# Patient Record
Sex: FEMALE | Race: WHITE | Employment: OTHER | URBAN - METROPOLITAN AREA
[De-identification: names, ages, dates, MRNs, and addresses within clinical notes are randomized per-mention and may not be internally consistent; named-entity substitution may affect disease eponyms.]

---

## 2020-01-16 ENCOUNTER — HOSPITAL ENCOUNTER (OUTPATIENT)
Dept: GENERAL RADIOLOGY | Age: 75
Discharge: HOME | End: 2020-01-16
Payer: MEDICARE

## 2020-01-16 DIAGNOSIS — M79.662 PAIN OF LEFT LOWER LEG: ICD-10-CM

## 2020-01-16 PROCEDURE — 73590 X-RAY EXAM OF LOWER LEG: CPT

## 2024-01-29 ENCOUNTER — HOSPITAL ENCOUNTER (OUTPATIENT)
Facility: HOSPITAL | Age: 79
Discharge: HOME | End: 2024-02-01
Payer: MEDICARE

## 2024-01-29 DIAGNOSIS — Z12.31 BREAST CANCER SCREENING BY MAMMOGRAM: ICD-10-CM

## 2024-01-29 DIAGNOSIS — Z78.0 ASYMPTOMATIC MENOPAUSAL STATE: ICD-10-CM

## 2024-01-29 PROCEDURE — 77063 BREAST TOMOSYNTHESIS BI: CPT

## 2024-01-29 PROCEDURE — 77080 DXA BONE DENSITY AXIAL: CPT

## 2024-10-05 ENCOUNTER — APPOINTMENT (OUTPATIENT)
Facility: HOSPITAL | Age: 79
End: 2024-10-05
Payer: MEDICARE

## 2024-10-05 ENCOUNTER — HOSPITAL ENCOUNTER (INPATIENT)
Facility: HOSPITAL | Age: 79
LOS: 6 days | Discharge: NURSING, REHAB OR LONG-TERM CARE | End: 2024-10-11
Attending: STUDENT IN AN ORGANIZED HEALTH CARE EDUCATION/TRAINING PROGRAM | Admitting: FAMILY MEDICINE
Payer: MEDICARE

## 2024-10-05 DIAGNOSIS — E87.1 HYPONATREMIA: ICD-10-CM

## 2024-10-05 DIAGNOSIS — R41.82 ALTERED MENTAL STATUS, UNSPECIFIED ALTERED MENTAL STATUS TYPE: ICD-10-CM

## 2024-10-05 DIAGNOSIS — J81.0 ACUTE PULMONARY EDEMA: Primary | ICD-10-CM

## 2024-10-05 LAB
ALBUMIN SERPL-MCNC: 3 G/DL (ref 3.5–5)
ALBUMIN SERPL-MCNC: 3.2 G/DL (ref 3.5–5)
ALBUMIN/GLOB SERPL: 0.9 (ref 1.1–2.2)
ALBUMIN/GLOB SERPL: 0.9 (ref 1.1–2.2)
ALP SERPL-CCNC: 68 U/L (ref 45–117)
ALP SERPL-CCNC: 72 U/L (ref 45–117)
ALT SERPL-CCNC: 114 U/L (ref 12–78)
ALT SERPL-CCNC: 126 U/L (ref 12–78)
ANION GAP SERPL CALC-SCNC: 7 MMOL/L (ref 2–12)
ANION GAP SERPL CALC-SCNC: 8 MMOL/L (ref 2–12)
AST SERPL-CCNC: 69 U/L (ref 15–37)
AST SERPL-CCNC: 81 U/L (ref 15–37)
BASOPHILS # BLD: 0 K/UL (ref 0–0.1)
BASOPHILS NFR BLD: 0 % (ref 0–1)
BILIRUB SERPL-MCNC: 1.1 MG/DL (ref 0.2–1)
BILIRUB SERPL-MCNC: 1.2 MG/DL (ref 0.2–1)
BUN SERPL-MCNC: 5 MG/DL (ref 6–20)
BUN SERPL-MCNC: 5 MG/DL (ref 6–20)
BUN/CREAT SERPL: 8 (ref 12–20)
BUN/CREAT SERPL: 9 (ref 12–20)
CALCIUM SERPL-MCNC: 8.4 MG/DL (ref 8.5–10.1)
CALCIUM SERPL-MCNC: 8.6 MG/DL (ref 8.5–10.1)
CHLORIDE SERPL-SCNC: 79 MMOL/L (ref 97–108)
CHLORIDE SERPL-SCNC: 81 MMOL/L (ref 97–108)
CO2 SERPL-SCNC: 26 MMOL/L (ref 21–32)
CO2 SERPL-SCNC: 28 MMOL/L (ref 21–32)
CREAT SERPL-MCNC: 0.55 MG/DL (ref 0.55–1.02)
CREAT SERPL-MCNC: 0.6 MG/DL (ref 0.55–1.02)
DIFFERENTIAL METHOD BLD: ABNORMAL
EOSINOPHIL # BLD: 0 K/UL (ref 0–0.4)
EOSINOPHIL NFR BLD: 0 % (ref 0–7)
ERYTHROCYTE [DISTWIDTH] IN BLOOD BY AUTOMATED COUNT: 11.2 % (ref 11.5–14.5)
FLUAV RNA SPEC QL NAA+PROBE: NOT DETECTED
FLUBV RNA SPEC QL NAA+PROBE: NOT DETECTED
GLOBULIN SER CALC-MCNC: 3.5 G/DL (ref 2–4)
GLOBULIN SER CALC-MCNC: 3.7 G/DL (ref 2–4)
GLUCOSE SERPL-MCNC: 137 MG/DL (ref 65–100)
GLUCOSE SERPL-MCNC: 167 MG/DL (ref 65–100)
HCT VFR BLD AUTO: 33 % (ref 35–47)
HGB BLD-MCNC: 11.8 G/DL (ref 11.5–16)
IMM GRANULOCYTES # BLD AUTO: 0.1 K/UL (ref 0–0.04)
IMM GRANULOCYTES NFR BLD AUTO: 1 % (ref 0–0.5)
LYMPHOCYTES # BLD: 1.4 K/UL (ref 0.8–3.5)
LYMPHOCYTES NFR BLD: 10 % (ref 12–49)
MCH RBC QN AUTO: 29.9 PG (ref 26–34)
MCHC RBC AUTO-ENTMCNC: 35.8 G/DL (ref 30–36.5)
MCV RBC AUTO: 83.5 FL (ref 80–99)
MONOCYTES # BLD: 1.8 K/UL (ref 0–1)
MONOCYTES NFR BLD: 13 % (ref 5–13)
NEUTS SEG # BLD: 10.3 K/UL (ref 1.8–8)
NEUTS SEG NFR BLD: 76 % (ref 32–75)
NRBC # BLD: 0 K/UL (ref 0–0.01)
NRBC BLD-RTO: 0 PER 100 WBC
PLATELET # BLD AUTO: 219 K/UL (ref 150–400)
PMV BLD AUTO: 10.1 FL (ref 8.9–12.9)
POTASSIUM SERPL-SCNC: 3.1 MMOL/L (ref 3.5–5.1)
POTASSIUM SERPL-SCNC: 3.4 MMOL/L (ref 3.5–5.1)
PROT SERPL-MCNC: 6.5 G/DL (ref 6.4–8.2)
PROT SERPL-MCNC: 6.9 G/DL (ref 6.4–8.2)
RBC # BLD AUTO: 3.95 M/UL (ref 3.8–5.2)
SARS-COV-2 RNA RESP QL NAA+PROBE: NOT DETECTED
SODIUM SERPL-SCNC: 114 MMOL/L (ref 136–145)
SODIUM SERPL-SCNC: 115 MMOL/L (ref 136–145)
SOURCE: NORMAL
T4 FREE SERPL-MCNC: 1.6 NG/DL (ref 0.8–1.5)
TROPONIN I SERPL HS-MCNC: 14 NG/L (ref 0–51)
TROPONIN I SERPL HS-MCNC: 18 NG/L (ref 0–51)
TSH SERPL DL<=0.05 MIU/L-ACNC: 2.33 UIU/ML (ref 0.36–3.74)
WBC # BLD AUTO: 13.6 K/UL (ref 3.6–11)

## 2024-10-05 PROCEDURE — 2060000000 HC ICU INTERMEDIATE R&B

## 2024-10-05 PROCEDURE — 70450 CT HEAD/BRAIN W/O DYE: CPT

## 2024-10-05 PROCEDURE — 93005 ELECTROCARDIOGRAM TRACING: CPT | Performed by: PHYSICIAN ASSISTANT

## 2024-10-05 PROCEDURE — 80053 COMPREHEN METABOLIC PANEL: CPT

## 2024-10-05 PROCEDURE — 84443 ASSAY THYROID STIM HORMONE: CPT

## 2024-10-05 PROCEDURE — 71046 X-RAY EXAM CHEST 2 VIEWS: CPT

## 2024-10-05 PROCEDURE — 99285 EMERGENCY DEPT VISIT HI MDM: CPT

## 2024-10-05 PROCEDURE — 87636 SARSCOV2 & INF A&B AMP PRB: CPT

## 2024-10-05 PROCEDURE — 84484 ASSAY OF TROPONIN QUANT: CPT

## 2024-10-05 PROCEDURE — 36415 COLL VENOUS BLD VENIPUNCTURE: CPT

## 2024-10-05 PROCEDURE — 2580000003 HC RX 258: Performed by: PHYSICIAN ASSISTANT

## 2024-10-05 PROCEDURE — 85025 COMPLETE CBC W/AUTO DIFF WBC: CPT

## 2024-10-05 PROCEDURE — 84439 ASSAY OF FREE THYROXINE: CPT

## 2024-10-05 PROCEDURE — 93005 ELECTROCARDIOGRAM TRACING: CPT | Performed by: STUDENT IN AN ORGANIZED HEALTH CARE EDUCATION/TRAINING PROGRAM

## 2024-10-05 RX ORDER — SODIUM CHLORIDE 9 MG/ML
INJECTION, SOLUTION INTRAVENOUS CONTINUOUS
Status: DISCONTINUED | OUTPATIENT
Start: 2024-10-05 | End: 2024-10-06

## 2024-10-05 RX ADMIN — SODIUM CHLORIDE: 9 INJECTION, SOLUTION INTRAVENOUS at 22:25

## 2024-10-05 ASSESSMENT — PAIN - FUNCTIONAL ASSESSMENT: PAIN_FUNCTIONAL_ASSESSMENT: NONE - DENIES PAIN

## 2024-10-05 NOTE — ED TRIAGE NOTES
Pt arrives with reports of non productive cough and sore throat x5 days. Denies chest pain or shortness of breath. Pt reports known sick contact with RSV recently.

## 2024-10-05 NOTE — ED PROVIDER NOTES
who either signs or Co-signs this chart in the absence of a cardiologist.        RADIOLOGY:   Non-plain film images such as CT, Ultrasound and MRI are read by the radiologist. Plain radiographic images are visualized and preliminarily interpreted by the emergency physician with the below findings:        Interpretation per the Radiologist below, if available at the time of this note:    CT HEAD WO CONTRAST   Final Result   No acute intracranial abnormality.         Electronically signed by SAMMY LIMA      XR CHEST (2 VW)   Final Result   No acute finding.         Electronically signed by SAMMY LIMA           LABS:  Labs Reviewed   CBC WITH AUTO DIFFERENTIAL - Abnormal; Notable for the following components:       Result Value    WBC 13.6 (*)     Hematocrit 33.0 (*)     RDW 11.2 (*)     Neutrophils % 76 (*)     Lymphocytes % 10 (*)     Immature Granulocytes % 1 (*)     Neutrophils Absolute 10.3 (*)     Monocytes Absolute 1.8 (*)     Immature Granulocytes Absolute 0.1 (*)     All other components within normal limits   COMPREHENSIVE METABOLIC PANEL - Abnormal; Notable for the following components:    Sodium 114 (*)     Potassium 3.4 (*)     Chloride 79 (*)     Glucose 167 (*)     BUN 5 (*)     BUN/Creatinine Ratio 8 (*)     Total Bilirubin 1.2 (*)      (*)     AST 81 (*)     Albumin 3.2 (*)     Albumin/Globulin Ratio 0.9 (*)     All other components within normal limits   TSH + FREE T4 PANEL - Abnormal; Notable for the following components:    T4 Free 1.6 (*)     All other components within normal limits   COVID-19 & INFLUENZA COMBO   URINE CULTURE HOLD SAMPLE   TROPONIN   URINALYSIS WITH MICROSCOPIC   TROPONIN   COMPREHENSIVE METABOLIC PANEL       All other labs were within normal range or not returned as of this dictation.    EMERGENCY DEPARTMENT COURSE and DIFFERENTIAL DIAGNOSIS/MDM:   Vitals:    Vitals:    10/05/24 1751 10/05/24 2015   BP: (!) 158/72    Pulse: 80    Resp: 20    Temp: 98.3 °F (36.8 °C)   Unknown  Readmission: No  Isolation Requirements: no  Recommended Level of Care: telemetry  Department: [facility] - [phone]  Consulting Provider: Dr. Ian Ellsworth    Other: 78-year-old F brought into ED today due to neighbor concern of altered mental status.  Patient reports cough, sore throat, nausea x 5 days.  Emesis x 1 today. Workup significant hyponatremia 114.  Started IV NS infusion 200 ml/ hr x 5 hours.  Requesting admission of patient.        PROCEDURES:  Unless otherwise noted below, none     Procedures      FINAL IMPRESSION      1. Hyponatremia    2. Altered mental status, unspecified altered mental status type          DISPOSITION/PLAN   DISPOSITION Admitted 10/05/2024 10:17:39 PM      PATIENT REFERRED TO:  No follow-up provider specified.    DISCHARGE MEDICATIONS:  New Prescriptions    No medications on file         Presentation, management, and disposition were discussed with the attending physician, Dr. Landon who is in agreement with plan of care.    (Please note that portions of this note were completed with a voice recognition program.  Efforts were made to edit the dictations but occasionally words are mis-transcribed.)    Renaldo Saldana PA-C (electronically signed)  Emergency Attending Physician / Physician Assistant / Nurse Practitioner             Renaldo Saldana PA-C  10/05/24 8692

## 2024-10-06 ENCOUNTER — APPOINTMENT (OUTPATIENT)
Facility: HOSPITAL | Age: 79
End: 2024-10-06
Payer: MEDICARE

## 2024-10-06 LAB
AMMONIA PLAS-SCNC: 18 UMOL/L
ANION GAP SERPL CALC-SCNC: 7 MMOL/L (ref 2–12)
ANION GAP SERPL CALC-SCNC: 8 MMOL/L (ref 2–12)
ANION GAP SERPL CALC-SCNC: 9 MMOL/L (ref 2–12)
APPEARANCE UR: CLEAR
APTT PPP: 30.8 SEC (ref 22.1–31)
B PERT DNA SPEC QL NAA+PROBE: NOT DETECTED
BACTERIA URNS QL MICRO: NEGATIVE /HPF
BASOPHILS # BLD: 0 K/UL (ref 0–0.1)
BASOPHILS NFR BLD: 0 % (ref 0–1)
BILIRUB UR QL: NEGATIVE
BORDETELLA PARAPERTUSSIS BY PCR: NOT DETECTED
BUN SERPL-MCNC: 5 MG/DL (ref 6–20)
BUN SERPL-MCNC: 7 MG/DL (ref 6–20)
BUN SERPL-MCNC: 8 MG/DL (ref 6–20)
BUN SERPL-MCNC: 8 MG/DL (ref 6–20)
BUN SERPL-MCNC: 9 MG/DL (ref 6–20)
BUN/CREAT SERPL: 12 (ref 12–20)
BUN/CREAT SERPL: 14 (ref 12–20)
BUN/CREAT SERPL: 16 (ref 12–20)
BUN/CREAT SERPL: 18 (ref 12–20)
BUN/CREAT SERPL: 9 (ref 12–20)
C PNEUM DNA SPEC QL NAA+PROBE: NOT DETECTED
CALCIUM SERPL-MCNC: 7.8 MG/DL (ref 8.5–10.1)
CALCIUM SERPL-MCNC: 7.8 MG/DL (ref 8.5–10.1)
CALCIUM SERPL-MCNC: 7.9 MG/DL (ref 8.5–10.1)
CALCIUM SERPL-MCNC: 8.3 MG/DL (ref 8.5–10.1)
CALCIUM SERPL-MCNC: 8.4 MG/DL (ref 8.5–10.1)
CHLORIDE SERPL-SCNC: 83 MMOL/L (ref 97–108)
CHLORIDE SERPL-SCNC: 83 MMOL/L (ref 97–108)
CHLORIDE SERPL-SCNC: 84 MMOL/L (ref 97–108)
CHLORIDE SERPL-SCNC: 85 MMOL/L (ref 97–108)
CHLORIDE SERPL-SCNC: 86 MMOL/L (ref 97–108)
CO2 SERPL-SCNC: 23 MMOL/L (ref 21–32)
CO2 SERPL-SCNC: 24 MMOL/L (ref 21–32)
CO2 SERPL-SCNC: 25 MMOL/L (ref 21–32)
COLOR UR: ABNORMAL
COMMENT:: NORMAL
CREAT SERPL-MCNC: 0.5 MG/DL (ref 0.55–1.02)
CREAT SERPL-MCNC: 0.5 MG/DL (ref 0.55–1.02)
CREAT SERPL-MCNC: 0.51 MG/DL (ref 0.55–1.02)
CREAT SERPL-MCNC: 0.53 MG/DL (ref 0.55–1.02)
CREAT SERPL-MCNC: 0.66 MG/DL (ref 0.55–1.02)
DIFFERENTIAL METHOD BLD: ABNORMAL
EKG ATRIAL RATE: 74 BPM
EKG ATRIAL RATE: 83 BPM
EKG DIAGNOSIS: NORMAL
EKG DIAGNOSIS: NORMAL
EKG P AXIS: -6 DEGREES
EKG P AXIS: 113 DEGREES
EKG P-R INTERVAL: 170 MS
EKG P-R INTERVAL: 180 MS
EKG Q-T INTERVAL: 440 MS
EKG Q-T INTERVAL: 448 MS
EKG QRS DURATION: 154 MS
EKG QRS DURATION: 154 MS
EKG QTC CALCULATION (BAZETT): 497 MS
EKG QTC CALCULATION (BAZETT): 517 MS
EKG R AXIS: 33 DEGREES
EKG R AXIS: 38 DEGREES
EKG T AXIS: 151 DEGREES
EKG T AXIS: 156 DEGREES
EKG VENTRICULAR RATE: 74 BPM
EKG VENTRICULAR RATE: 83 BPM
EOSINOPHIL # BLD: 0 K/UL (ref 0–0.4)
EOSINOPHIL NFR BLD: 0 % (ref 0–7)
EPITH CASTS URNS QL MICRO: ABNORMAL /LPF
ERYTHROCYTE [DISTWIDTH] IN BLOOD BY AUTOMATED COUNT: 11.6 % (ref 11.5–14.5)
ETHANOL SERPL-MCNC: <10 MG/DL (ref 0–0.08)
FLUAV SUBTYP SPEC NAA+PROBE: NOT DETECTED
FLUBV RNA SPEC QL NAA+PROBE: NOT DETECTED
GLUCOSE SERPL-MCNC: 122 MG/DL (ref 65–100)
GLUCOSE SERPL-MCNC: 126 MG/DL (ref 65–100)
GLUCOSE SERPL-MCNC: 127 MG/DL (ref 65–100)
GLUCOSE SERPL-MCNC: 129 MG/DL (ref 65–100)
GLUCOSE SERPL-MCNC: 139 MG/DL (ref 65–100)
GLUCOSE UR STRIP.AUTO-MCNC: 100 MG/DL
HADV DNA SPEC QL NAA+PROBE: NOT DETECTED
HCOV 229E RNA SPEC QL NAA+PROBE: NOT DETECTED
HCOV HKU1 RNA SPEC QL NAA+PROBE: NOT DETECTED
HCOV NL63 RNA SPEC QL NAA+PROBE: NOT DETECTED
HCOV OC43 RNA SPEC QL NAA+PROBE: NOT DETECTED
HCT VFR BLD AUTO: 32.9 % (ref 35–47)
HGB BLD-MCNC: 11.6 G/DL (ref 11.5–16)
HGB UR QL STRIP: NEGATIVE
HMPV RNA SPEC QL NAA+PROBE: NOT DETECTED
HPIV1 RNA SPEC QL NAA+PROBE: NOT DETECTED
HPIV2 RNA SPEC QL NAA+PROBE: NOT DETECTED
HPIV3 RNA SPEC QL NAA+PROBE: NOT DETECTED
HPIV4 RNA SPEC QL NAA+PROBE: NOT DETECTED
IMM GRANULOCYTES # BLD AUTO: 0.1 K/UL (ref 0–0.04)
IMM GRANULOCYTES NFR BLD AUTO: 1 % (ref 0–0.5)
INR PPP: 1.1 (ref 0.9–1.1)
KETONES UR QL STRIP.AUTO: 40 MG/DL
LACTATE SERPL-SCNC: 1.4 MMOL/L (ref 0.4–2)
LEUKOCYTE ESTERASE UR QL STRIP.AUTO: NEGATIVE
LYMPHOCYTES # BLD: 0.9 K/UL (ref 0.8–3.5)
LYMPHOCYTES NFR BLD: 7 % (ref 12–49)
M PNEUMO DNA SPEC QL NAA+PROBE: NOT DETECTED
MAGNESIUM SERPL-MCNC: 1.6 MG/DL (ref 1.6–2.4)
MCH RBC QN AUTO: 29.9 PG (ref 26–34)
MCHC RBC AUTO-ENTMCNC: 35.3 G/DL (ref 30–36.5)
MCV RBC AUTO: 84.8 FL (ref 80–99)
MONOCYTES # BLD: 1.8 K/UL (ref 0–1)
MONOCYTES NFR BLD: 14 % (ref 5–13)
NEUTS SEG # BLD: 10.3 K/UL (ref 1.8–8)
NEUTS SEG NFR BLD: 78 % (ref 32–75)
NITRITE UR QL STRIP.AUTO: NEGATIVE
NRBC # BLD: 0 K/UL (ref 0–0.01)
NRBC BLD-RTO: 0 PER 100 WBC
NT PRO BNP: ABNORMAL PG/ML
OSMOLALITY UR: 525 MOSM/KG H2O
OSMOLALITY UR: 570 MOSM/KG H2O
PH UR STRIP: 6.5 (ref 5–8)
PHOSPHATE SERPL-MCNC: 2 MG/DL (ref 2.6–4.7)
PLATELET # BLD AUTO: 242 K/UL (ref 150–400)
PMV BLD AUTO: 10.4 FL (ref 8.9–12.9)
POTASSIUM SERPL-SCNC: 3.2 MMOL/L (ref 3.5–5.1)
POTASSIUM SERPL-SCNC: 3.5 MMOL/L (ref 3.5–5.1)
POTASSIUM SERPL-SCNC: 3.6 MMOL/L (ref 3.5–5.1)
POTASSIUM SERPL-SCNC: 3.7 MMOL/L (ref 3.5–5.1)
POTASSIUM SERPL-SCNC: 4 MMOL/L (ref 3.5–5.1)
PROCALCITONIN SERPL-MCNC: 0.21 NG/ML
PROT UR STRIP-MCNC: ABNORMAL MG/DL
PROTHROMBIN TIME: 11.5 SEC (ref 9–11.1)
RBC # BLD AUTO: 3.88 M/UL (ref 3.8–5.2)
RBC #/AREA URNS HPF: ABNORMAL /HPF (ref 0–5)
RSV RNA SPEC QL NAA+PROBE: NOT DETECTED
RV+EV RNA SPEC QL NAA+PROBE: NOT DETECTED
S PYO DNA THROAT QL NAA+PROBE: NOT DETECTED
SARS-COV-2 RNA RESP QL NAA+PROBE: NOT DETECTED
SODIUM SERPL-SCNC: 115 MMOL/L (ref 136–145)
SODIUM SERPL-SCNC: 116 MMOL/L (ref 136–145)
SODIUM SERPL-SCNC: 116 MMOL/L (ref 136–145)
SODIUM SERPL-SCNC: 117 MMOL/L (ref 136–145)
SODIUM SERPL-SCNC: 119 MMOL/L (ref 136–145)
SODIUM UR-SCNC: 21 MMOL/L
SODIUM UR-SCNC: 23 MMOL/L
SP GR UR REFRACTOMETRY: 1.02 (ref 1–1.03)
SPECIMEN HOLD: NORMAL
T4 FREE SERPL-MCNC: 1.5 NG/DL (ref 0.8–1.5)
THERAPEUTIC RANGE: NORMAL SECS (ref 58–77)
TSH SERPL DL<=0.05 MIU/L-ACNC: 2.02 UIU/ML (ref 0.36–3.74)
TSH SERPL DL<=0.05 MIU/L-ACNC: 2.07 UIU/ML (ref 0.36–3.74)
UROBILINOGEN UR QL STRIP.AUTO: 4 EU/DL (ref 0.2–1)
VIT B12 SERPL-MCNC: 1538 PG/ML (ref 193–986)
WBC # BLD AUTO: 13 K/UL (ref 3.6–11)
WBC URNS QL MICRO: ABNORMAL /HPF (ref 0–4)

## 2024-10-06 PROCEDURE — 84100 ASSAY OF PHOSPHORUS: CPT

## 2024-10-06 PROCEDURE — 71275 CT ANGIOGRAPHY CHEST: CPT

## 2024-10-06 PROCEDURE — 82077 ASSAY SPEC XCP UR&BREATH IA: CPT

## 2024-10-06 PROCEDURE — 76705 ECHO EXAM OF ABDOMEN: CPT

## 2024-10-06 PROCEDURE — 51702 INSERT TEMP BLADDER CATH: CPT

## 2024-10-06 PROCEDURE — 94760 N-INVAS EAR/PLS OXIMETRY 1: CPT

## 2024-10-06 PROCEDURE — 2580000003 HC RX 258: Performed by: FAMILY MEDICINE

## 2024-10-06 PROCEDURE — 85610 PROTHROMBIN TIME: CPT

## 2024-10-06 PROCEDURE — 84300 ASSAY OF URINE SODIUM: CPT

## 2024-10-06 PROCEDURE — 6370000000 HC RX 637 (ALT 250 FOR IP): Performed by: HOSPITALIST

## 2024-10-06 PROCEDURE — 2580000003 HC RX 258: Performed by: HOSPITALIST

## 2024-10-06 PROCEDURE — 84443 ASSAY THYROID STIM HORMONE: CPT

## 2024-10-06 PROCEDURE — 84145 PROCALCITONIN (PCT): CPT

## 2024-10-06 PROCEDURE — 82607 VITAMIN B-12: CPT

## 2024-10-06 PROCEDURE — 81001 URINALYSIS AUTO W/SCOPE: CPT

## 2024-10-06 PROCEDURE — 85730 THROMBOPLASTIN TIME PARTIAL: CPT

## 2024-10-06 PROCEDURE — 2580000003 HC RX 258: Performed by: STUDENT IN AN ORGANIZED HEALTH CARE EDUCATION/TRAINING PROGRAM

## 2024-10-06 PROCEDURE — 85025 COMPLETE CBC W/AUTO DIFF WBC: CPT

## 2024-10-06 PROCEDURE — 80048 BASIC METABOLIC PNL TOTAL CA: CPT

## 2024-10-06 PROCEDURE — 6360000004 HC RX CONTRAST MEDICATION: Performed by: RADIOLOGY

## 2024-10-06 PROCEDURE — 2500000003 HC RX 250 WO HCPCS: Performed by: HOSPITALIST

## 2024-10-06 PROCEDURE — 2700000000 HC OXYGEN THERAPY PER DAY

## 2024-10-06 PROCEDURE — 84439 ASSAY OF FREE THYROXINE: CPT

## 2024-10-06 PROCEDURE — 87651 STREP A DNA AMP PROBE: CPT

## 2024-10-06 PROCEDURE — 83735 ASSAY OF MAGNESIUM: CPT

## 2024-10-06 PROCEDURE — 83605 ASSAY OF LACTIC ACID: CPT

## 2024-10-06 PROCEDURE — 2060000000 HC ICU INTERMEDIATE R&B

## 2024-10-06 PROCEDURE — 0202U NFCT DS 22 TRGT SARS-COV-2: CPT

## 2024-10-06 PROCEDURE — 6360000002 HC RX W HCPCS: Performed by: HOSPITALIST

## 2024-10-06 PROCEDURE — 82140 ASSAY OF AMMONIA: CPT

## 2024-10-06 PROCEDURE — 74177 CT ABD & PELVIS W/CONTRAST: CPT

## 2024-10-06 PROCEDURE — 2580000003 HC RX 258: Performed by: INTERNAL MEDICINE

## 2024-10-06 PROCEDURE — 36415 COLL VENOUS BLD VENIPUNCTURE: CPT

## 2024-10-06 PROCEDURE — 6370000000 HC RX 637 (ALT 250 FOR IP): Performed by: FAMILY MEDICINE

## 2024-10-06 PROCEDURE — 83935 ASSAY OF URINE OSMOLALITY: CPT

## 2024-10-06 PROCEDURE — 83880 ASSAY OF NATRIURETIC PEPTIDE: CPT

## 2024-10-06 RX ORDER — LEVOTHYROXINE SODIUM 75 UG/1
75 TABLET ORAL DAILY
Status: DISCONTINUED | OUTPATIENT
Start: 2024-10-06 | End: 2024-10-11 | Stop reason: HOSPADM

## 2024-10-06 RX ORDER — ASPIRIN 81 MG/1
81 TABLET, CHEWABLE ORAL DAILY
Status: DISCONTINUED | OUTPATIENT
Start: 2024-10-06 | End: 2024-10-11 | Stop reason: HOSPADM

## 2024-10-06 RX ORDER — SODIUM CHLORIDE 9 MG/ML
INJECTION, SOLUTION INTRAVENOUS PRN
Status: DISCONTINUED | OUTPATIENT
Start: 2024-10-06 | End: 2024-10-11 | Stop reason: HOSPADM

## 2024-10-06 RX ORDER — VALSARTAN AND HYDROCHLOROTHIAZIDE 80; 12.5 MG/1; MG/1
1 TABLET, FILM COATED ORAL DAILY
Status: ON HOLD | COMMUNITY
Start: 2013-02-21 | End: 2024-10-11 | Stop reason: HOSPADM

## 2024-10-06 RX ORDER — NADOLOL 40 MG/1
1 TABLET ORAL DAILY
COMMUNITY
Start: 2013-01-19

## 2024-10-06 RX ORDER — SODIUM CHLORIDE 0.9 % (FLUSH) 0.9 %
5-40 SYRINGE (ML) INJECTION EVERY 12 HOURS SCHEDULED
Status: DISCONTINUED | OUTPATIENT
Start: 2024-10-06 | End: 2024-10-11 | Stop reason: HOSPADM

## 2024-10-06 RX ORDER — 3% SODIUM CHLORIDE 3 G/100ML
30 INJECTION, SOLUTION INTRAVENOUS CONTINUOUS
Status: DISPENSED | OUTPATIENT
Start: 2024-10-06 | End: 2024-10-06

## 2024-10-06 RX ORDER — ONDANSETRON 4 MG/1
4 TABLET, ORALLY DISINTEGRATING ORAL EVERY 8 HOURS PRN
Status: DISCONTINUED | OUTPATIENT
Start: 2024-10-06 | End: 2024-10-11 | Stop reason: HOSPADM

## 2024-10-06 RX ORDER — IOPAMIDOL 755 MG/ML
100 INJECTION, SOLUTION INTRAVASCULAR
Status: COMPLETED | OUTPATIENT
Start: 2024-10-06 | End: 2024-10-06

## 2024-10-06 RX ORDER — 3% SODIUM CHLORIDE 3 G/100ML
30 INJECTION, SOLUTION INTRAVENOUS ONCE
Status: DISCONTINUED | OUTPATIENT
Start: 2024-10-06 | End: 2024-10-06 | Stop reason: SDUPTHER

## 2024-10-06 RX ORDER — VALSARTAN 40 MG/1
80 TABLET ORAL DAILY
Status: DISCONTINUED | OUTPATIENT
Start: 2024-10-06 | End: 2024-10-10

## 2024-10-06 RX ORDER — LEVOTHYROXINE SODIUM 75 UG/1
75 TABLET ORAL DAILY
COMMUNITY

## 2024-10-06 RX ORDER — POLYETHYLENE GLYCOL 3350 17 G/17G
17 POWDER, FOR SOLUTION ORAL DAILY PRN
Status: DISCONTINUED | OUTPATIENT
Start: 2024-10-06 | End: 2024-10-11 | Stop reason: HOSPADM

## 2024-10-06 RX ORDER — BENZONATATE 100 MG/1
100 CAPSULE ORAL 3 TIMES DAILY PRN
Status: DISCONTINUED | OUTPATIENT
Start: 2024-10-06 | End: 2024-10-11 | Stop reason: HOSPADM

## 2024-10-06 RX ORDER — NADOLOL 40 MG/1
40 TABLET ORAL DAILY
Status: DISCONTINUED | OUTPATIENT
Start: 2024-10-06 | End: 2024-10-11 | Stop reason: HOSPADM

## 2024-10-06 RX ORDER — ACETAMINOPHEN 650 MG/1
650 SUPPOSITORY RECTAL EVERY 6 HOURS PRN
Status: DISCONTINUED | OUTPATIENT
Start: 2024-10-06 | End: 2024-10-06

## 2024-10-06 RX ORDER — ACETAMINOPHEN 325 MG/1
650 TABLET ORAL EVERY 6 HOURS PRN
Status: DISCONTINUED | OUTPATIENT
Start: 2024-10-06 | End: 2024-10-06

## 2024-10-06 RX ORDER — SODIUM CHLORIDE 9 MG/ML
INJECTION, SOLUTION INTRAVENOUS CONTINUOUS
Status: DISCONTINUED | OUTPATIENT
Start: 2024-10-06 | End: 2024-10-06

## 2024-10-06 RX ORDER — PRAVASTATIN SODIUM 40 MG
1 TABLET ORAL DAILY
COMMUNITY
Start: 2013-03-20

## 2024-10-06 RX ORDER — ASPIRIN 81 MG/1
81 TABLET ORAL DAILY
COMMUNITY
Start: 2013-03-25

## 2024-10-06 RX ORDER — SODIUM CHLORIDE 0.9 % (FLUSH) 0.9 %
5-40 SYRINGE (ML) INJECTION PRN
Status: DISCONTINUED | OUTPATIENT
Start: 2024-10-06 | End: 2024-10-11 | Stop reason: HOSPADM

## 2024-10-06 RX ORDER — ONDANSETRON 2 MG/ML
4 INJECTION INTRAMUSCULAR; INTRAVENOUS EVERY 6 HOURS PRN
Status: DISCONTINUED | OUTPATIENT
Start: 2024-10-06 | End: 2024-10-11 | Stop reason: HOSPADM

## 2024-10-06 RX ORDER — ERYTHROMYCIN 5 MG/G
OINTMENT OPHTHALMIC EVERY 8 HOURS SCHEDULED
Status: DISCONTINUED | OUTPATIENT
Start: 2024-10-06 | End: 2024-10-11 | Stop reason: HOSPADM

## 2024-10-06 RX ADMIN — NADOLOL 40 MG: 40 TABLET ORAL at 11:13

## 2024-10-06 RX ADMIN — VALSARTAN 80 MG: 40 TABLET, FILM COATED ORAL at 08:53

## 2024-10-06 RX ADMIN — SODIUM CHLORIDE, PRESERVATIVE FREE 10 ML: 5 INJECTION INTRAVENOUS at 08:20

## 2024-10-06 RX ADMIN — ERYTHROMYCIN: 5 OINTMENT OPHTHALMIC at 21:00

## 2024-10-06 RX ADMIN — LEVOTHYROXINE SODIUM 75 MCG: 0.07 TABLET ORAL at 08:52

## 2024-10-06 RX ADMIN — SODIUM CHLORIDE, PRESERVATIVE FREE 10 ML: 5 INJECTION INTRAVENOUS at 20:31

## 2024-10-06 RX ADMIN — WATER 1000 MG: 1 INJECTION INTRAMUSCULAR; INTRAVENOUS; SUBCUTANEOUS at 11:12

## 2024-10-06 RX ADMIN — DOXYCYCLINE 100 MG: 100 INJECTION, POWDER, LYOPHILIZED, FOR SOLUTION INTRAVENOUS at 11:20

## 2024-10-06 RX ADMIN — POTASSIUM BICARBONATE 40 MEQ: 782 TABLET, EFFERVESCENT ORAL at 08:18

## 2024-10-06 RX ADMIN — IOPAMIDOL 100 ML: 755 INJECTION, SOLUTION INTRAVENOUS at 07:24

## 2024-10-06 RX ADMIN — ASPIRIN 81 MG CHEWABLE TABLET 81 MG: 81 TABLET CHEWABLE at 08:53

## 2024-10-06 RX ADMIN — SODIUM CHLORIDE 30 ML/HR: 3 INJECTION, SOLUTION INTRAVENOUS at 18:05

## 2024-10-06 RX ADMIN — ERYTHROMYCIN: 5 OINTMENT OPHTHALMIC at 14:08

## 2024-10-06 RX ADMIN — DOXYCYCLINE 100 MG: 100 INJECTION, POWDER, LYOPHILIZED, FOR SOLUTION INTRAVENOUS at 23:20

## 2024-10-06 RX ADMIN — SODIUM CHLORIDE: 9 INJECTION, SOLUTION INTRAVENOUS at 23:19

## 2024-10-06 RX ADMIN — SODIUM CHLORIDE: 9 INJECTION, SOLUTION INTRAVENOUS at 04:28

## 2024-10-06 ASSESSMENT — PAIN SCALES - GENERAL: PAINLEVEL_OUTOF10: 0

## 2024-10-06 NOTE — H&P
antihypertensive medications as needed    8.  Acute respiratory failure with hypoxia  -O2 saturation decreased to 89% on room air  -Placed on 2 L O2 via nasal cannula   -Order CTA chest to rule out PE/occult pneumonia  -Continuous pulse oximetry monitoring  -Keep goal O2 saturation greater than 94%    9.  Cough  -Order Tessalon perles 100 mg po TID prn    10.  Sore throat  -Order group A strep screen    11.  Urinary retention  -Currently has new indwelling Obrien catheter in place, inserted prior to transfer from transferring ER  -Monitor urine output  -Consider for urology consultation    12.  Hyperlipidemia  -Hold statins due to elevated LFTs  -Check lipid panel    13.  Hypothyroidism  -Check TSH and free T4 level  -Continue levothyroxine 75 mcg p.o. daily    DIET: NPO UNTIL PASSES NURSING SWALLOW ASSESSMENT.  IF PASSES, THEN MAY START LOW FAT/ LOW CHOLESTEROL/ 2 GRAM SODIUM/ 4 CARB CHOICES/ 60 GRAM PER MEAL DIET.   ISOLATION PRECAUTIONS: No active isolations  CODE STATUS: Full Code   Central Line:   none  DVT PROPHYLAXIS: SCDs  FUNCTIONAL STATUS PRIOR TO HOSPITALIZATION: Fully active and ambulatory; able to carry on all self-care without restriction.  Ambulatory status/function: By self   EARLY MOBILITY ASSESSMENT: Recommend routine ambulation while hospitalized with the assistance of nursing staff  ANTICIPATED DISCHARGE: Greater than 48 hours.  ANTICIPATED DISPOSITION: Home with Home Healthcare  EMERGENCY CONTACT:   Taylor Sun (Friend)  [phone] (Mobile)     CRITICAL CARE WAS PERFORMED FOR THIS ENCOUNTER: YES. I had a face to face encounter with the patient, reviewed and interpreted patient data including clinical events, labs, images, vital signs, I/O's, and examined patient.  I have discussed the case and the plan and management of the patient's care with the consulting services, the bedside nurses and necessary ancillary providers.  This patient has a high probability of imminent, clinically  significant deterioration, which requires the highest level of preparedness to intervene urgently. I participated in the decision-making and personally managed or directed the management of the following life and organ supporting interventions that required my frequent assessment to treat or prevent imminent deterioration.  I personally spent 60 minutes of critical care time.  This is time spent at this critically ill patient's bedside actively involved in patient care as well as the coordination of care and discussions with the patient's family.  This does not include any procedural time which has been billed separately.    ADVANCED DIRECTIVE/ CODE STATUS:  FULL CODE as per discussion with patient.   Signed By: Ian Ellsworth MD     October 6, 2024         Please note that this dictation may have been completed with Dragon, the computer voice recognition software.  Quite often unanticipated grammatical, syntax, homophones, and other interpretive errors are inadvertently transcribed by the computer software.  Please disregard these errors.  Please excuse any errors that have escaped final proofreading.      Addendum:  History of CAD  -Continue aspirin    Addendum:  EXAM:  :  TERRY CATHETER IN PLACE WITH DARK, YELLOW-ORANGE URINE PRESENT    Addendum:  EXAM:   Eye:  yellow thick drainage from the right eye.  Erythema of both conjunctiva.  No scleral icterus.  Diagnosis:  Conjunctivitis  -Order Erythromycin ointment to eyes

## 2024-10-06 NOTE — ED NOTES
TRANSFER - IN REPORT:    Verbal report received from MATEO Cadet on Shila Hurst  being received from [place] ER for routine progression of patient care      Report consisted of patient's Situation, Background, Assessment and   Recommendations(SBAR).     Information from the following report(s) Nurse Handoff Report, ED Encounter Summary, ED SBAR, Intake/Output, Recent Results, and Cardiac Rhythm NSR w/ PVC 's was reviewed with the receiving nurse.    Opportunity for questions and clarification was provided.      Assessment completed upon patient's arrival to unit and care assumed.

## 2024-10-06 NOTE — CONSULTS
NEPHROLOGY CONSULT NOTE     Patient: Shila Hurst MRN: [id number]  PCP: Cayla Gilmore MD   :     1945  Age:   78 y.o.  Sex:  female      Referring physician: Coni Sadler MD  Reason for consultation: 78 y.o. female with Acute hyponatremia [E87.1] complicated by CARRILLO   Admission Date: 10/5/2024  5:43 PM  LOS: 1 day      ASSESSMENT and PLAN :   Hyponatremia:  - suspect secondary to poor po intake/resp illness/thiazide   - check urine Na, osms, TSH, cortisol  - d/c IVF and monitor  - hold on 3% for now  - Q4 hr Na levels for now  - goal Na 120 by 7pm    Urinary retention:  - fuentes in place    Encephalopathy:  - suspect 2/2 HypoNa  - imaging neg    Hypokelamia:  - secondary to thiazide  - repletion ordered    Hypoxia:  - per primary team    HTN  HLD  Nausea/vomiting  Hypothyroidism     Active Problems / Assessment AAActive  :   Principal Problem:    Acute hyponatremia  Resolved Problems:    * No resolved hospital problems. *       Subjective:   HPI: Shila Hurst is a 78 y.o.  female who has been admitted to the hospital for hypoNa.  She has a hx of HTN, HLD, hypothyroidism, CAD s/p CABG.  On ARB/thiazide for BP at home.  Has been c/o soar throat and cough for about 5 days prior to admission.  Drinking mostly water, poor po intake for several days. Presented to  ER for the above.  Na found to 114.  Also had urinary retention and had a fuentes placed in the ER. Given IV NS, Na up to 117 around 3am.  Tx to [facility] this AM.  Pt with  mild confusion but appears to be approaching her baseline.  No cp.  Ongoing SOB and on O2.  No n/v/d reported now.    Past Medical Hx:   Past Medical History:   Diagnosis Date    Hypertension         Past Surgical Hx:   History reviewed. No pertinent surgical history.    Medications:  Prior to Admission medications    Not on File       Allergies   Allergen Reactions    Atorvastatin Calcium     Choline Fenofibrate     Clonidine     Ezetimibe     Simvastatin

## 2024-10-06 NOTE — ED NOTES
TRANSFER - OUT REPORT:    Verbal report given to MATEO Joiner on Shila Hurst  being transferred to [facility] for routine progression of patient care       Report consisted of patient's Situation, Background, Assessment and   Recommendations(SBAR).     Information from the following report(s) Nurse Handoff Report, ED Encounter Summary, ED SBAR, MAR, Recent Results, and Event Log was reviewed with the receiving nurse.    Howes Fall Assessment:    Presents to emergency department  because of falls (Syncope, seizure, or loss of consciousness): No  Age > 70: Yes  Altered Mental Status, Intoxication with alcohol or substance confusion (Disorientation, impaired judgment, poor safety awaremess, or inability to follow instructions): No  Impaired Mobility: Ambulates or transfers with assistive devices or assistance; Unable to ambulate or transer.: Yes  Nursing Judgement: Yes          Lines:   Peripheral IV 10/05/24 Right Antecubital (Active)        Opportunity for questions and clarification was provided.      Patient transported with:  Monitor, O2 @ 3lpm, and Registered Nurse

## 2024-10-06 NOTE — PROGRESS NOTES
[facility]                                                                                          Hospitalist Progress Note  Coni Sadler MD  Office Phone: [phone]        Date of Service:  10/6/2024  NAME:  Shila Hurst  :  1945  MRN:  [id number]       Admission Summary:   Patient admitted for severe hyponatremia when she presented with AMS.  She lives alone, neighbors brought her to the ED.            Interval history / Subjective:   Follow-up for severe hyponatremia.  Acute metabolic encephalopathy  Patient just got arrived to the intermediate care unit from the ED.  She is sleepy, arousable, communicating but dozes back off.  Her friend/neighbor at the bedside.  She noticed patient was little bit confused elderly in the day yesterday.  Later her son called her to check on her saying she sounded confused when he talked with his mother on the phone.  Her friend  then took her to the [place] ED          Assessment & Plan:     Severe hyponatremia, acute, symptomatic.  The hyponatremia is caused most probably by limited oral intake, use of thiazide diuretics (she is on losartan HCTZ)  Sodium 114 on admission, 115 most recently.  Acute metabolic encephalopathy due to the above, dehydration  -Head CT negative.  Discontinue HCTZ  -Patient had received some saline in the ED  - Nephrology consulted, holding off of IV fluids and hypertonic saline pending next sodium check.    Hypokalemia, replaced and corrected.    Nausea and vomiting associated with mild elevated LFTs.  -CT A/P unremarkable except cholelithiasis without evidence of cholecystitis, CBD dilatation.  LFT elevation likely due to dehydration versus recent viral infection.  Flu and COVID test negative    Hypoxia.  Leukocytosis without other manifestation of sepsis.  Probable right upper lobe pneumonia.  Patient with preceding cough and other respiratory symptoms.  Flu and COVID-negative.  Start

## 2024-10-06 NOTE — ED NOTES
[facility] Outgoing Transfer Nursing Note      Verbal and/or Written report given to Emeli RN by Helio Santos RN on Shila Hurst a 78 y.o. female who was admitted on 10/5/2024  5:43 PM and being transferred for routine progression of patient care.      Report consisted of the following information Nurse Handoff Report, ED Encounter Summary, ED SBAR, MAR, and Recent Results and the information was reviewed with the receiving nurse.            Code Status: No Order      Chief Complaint: Cough      Admit Diagnosis: Acute hyponatremia [E87.1]      Admitting Provider: Ian Ellsworth MD      Surgery: * No surgery found *       Infections: No active infections      Allergies: Atorvastatin calcium, Choline fenofibrate, Clonidine, Ezetimibe, Simvastatin, Sulfamethoxazole, Sulfamethoxazole-trimethoprim, and Trimethoprim      Current diet: No diet orders on file      Lines:   Peripheral IV 10/05/24 Right Antecubital (Active)                Vital Signs:   Patient Vitals for the past 12 hrs:   Temp Pulse Resp BP SpO2   10/06/24 0400 -- 82 20 (!) 171/99 --   10/06/24 0300 -- 78 16 (!) 164/97 --   10/06/24 0200 -- 83 18 (!) 157/86 --   10/06/24 0130 -- 77 17 (!) 153/79 --   10/06/24 0100 -- 81 18 (!) 154/108 --   10/06/24 0030 -- 78 20 (!) 142/73 --   10/06/24 0000 -- 80 18 (!) 172/91 --   10/05/24 2330 -- 73 16 (!) 156/133 --   10/05/24 2315 -- 80 17 (!) 168/85 --   10/05/24 2300 -- 77 20 (!) 161/77 (!) 89 %   10/05/24 2245 -- 74 20 (!) 154/81 90 %   10/05/24 2230 -- 73 15 (!) 156/71 (!) 89 %   10/05/24 2215 -- 76 21 (!) 141/116 91 %   10/05/24 2145 -- 80 19 (!) 160/76 92 %   10/05/24 2115 -- 79 15 (!) 175/93 --   10/05/24 2045 -- 89 21 (!) 151/104 93 %   10/05/24 2015 -- -- -- (!) 168/76 96 %   10/05/24 1751 98.3 °F (36.8 °C) 80 20 (!) 158/72 93 %           Intake & Output:     Intake/Output Summary (Last 24 hours) at 10/6/2024 0448  Last data filed at 10/6/2024 0430  Gross per 24 hour

## 2024-10-07 ENCOUNTER — APPOINTMENT (OUTPATIENT)
Facility: HOSPITAL | Age: 79
End: 2024-10-07
Attending: HOSPITALIST
Payer: MEDICARE

## 2024-10-07 LAB
ALBUMIN SERPL-MCNC: 2.6 G/DL (ref 3.5–5)
ALBUMIN/GLOB SERPL: 0.9 (ref 1.1–2.2)
ALP SERPL-CCNC: 72 U/L (ref 45–117)
ALT SERPL-CCNC: 93 U/L (ref 12–78)
ANION GAP SERPL CALC-SCNC: 7 MMOL/L (ref 2–12)
ANION GAP SERPL CALC-SCNC: 9 MMOL/L (ref 2–12)
AST SERPL-CCNC: 50 U/L (ref 15–37)
BASOPHILS # BLD: 0 K/UL (ref 0–0.1)
BASOPHILS NFR BLD: 0 % (ref 0–1)
BILIRUB SERPL-MCNC: 1 MG/DL (ref 0.2–1)
BUN SERPL-MCNC: 10 MG/DL (ref 6–20)
BUN SERPL-MCNC: 13 MG/DL (ref 6–20)
BUN/CREAT SERPL: 18 (ref 12–20)
BUN/CREAT SERPL: 21 (ref 12–20)
CALCIUM SERPL-MCNC: 8 MG/DL (ref 8.5–10.1)
CALCIUM SERPL-MCNC: 8.4 MG/DL (ref 8.5–10.1)
CHLORIDE SERPL-SCNC: 86 MMOL/L (ref 97–108)
CHLORIDE SERPL-SCNC: 87 MMOL/L (ref 97–108)
CO2 SERPL-SCNC: 23 MMOL/L (ref 21–32)
CO2 SERPL-SCNC: 26 MMOL/L (ref 21–32)
CORTIS AM PEAK SERPL-MCNC: 30.7 UG/DL (ref 4.3–22.45)
CREAT SERPL-MCNC: 0.47 MG/DL (ref 0.55–1.02)
CREAT SERPL-MCNC: 0.71 MG/DL (ref 0.55–1.02)
DIFFERENTIAL METHOD BLD: ABNORMAL
EOSINOPHIL # BLD: 0 K/UL (ref 0–0.4)
EOSINOPHIL NFR BLD: 0 % (ref 0–7)
ERYTHROCYTE [DISTWIDTH] IN BLOOD BY AUTOMATED COUNT: 11.1 % (ref 11.5–14.5)
GLOBULIN SER CALC-MCNC: 3 G/DL (ref 2–4)
GLUCOSE SERPL-MCNC: 135 MG/DL (ref 65–100)
GLUCOSE SERPL-MCNC: 138 MG/DL (ref 65–100)
HCT VFR BLD AUTO: 29.4 % (ref 35–47)
HGB BLD-MCNC: 10.6 G/DL (ref 11.5–16)
IMM GRANULOCYTES # BLD AUTO: 0.1 K/UL (ref 0–0.04)
IMM GRANULOCYTES NFR BLD AUTO: 1 % (ref 0–0.5)
LYMPHOCYTES # BLD: 1.3 K/UL (ref 0.8–3.5)
LYMPHOCYTES NFR BLD: 9 % (ref 12–49)
MAGNESIUM SERPL-MCNC: 1.6 MG/DL (ref 1.6–2.4)
MAGNESIUM SERPL-MCNC: 1.7 MG/DL (ref 1.6–2.4)
MCH RBC QN AUTO: 29.9 PG (ref 26–34)
MCHC RBC AUTO-ENTMCNC: 36.1 G/DL (ref 30–36.5)
MCV RBC AUTO: 83.1 FL (ref 80–99)
MONOCYTES # BLD: 2 K/UL (ref 0–1)
MONOCYTES NFR BLD: 14 % (ref 5–13)
NEUTS SEG # BLD: 10.9 K/UL (ref 1.8–8)
NEUTS SEG NFR BLD: 76 % (ref 32–75)
NRBC # BLD: 0 K/UL (ref 0–0.01)
NRBC BLD-RTO: 0 PER 100 WBC
PLATELET # BLD AUTO: 207 K/UL (ref 150–400)
PMV BLD AUTO: 10.2 FL (ref 8.9–12.9)
POTASSIUM SERPL-SCNC: 3.1 MMOL/L (ref 3.5–5.1)
POTASSIUM SERPL-SCNC: 3.5 MMOL/L (ref 3.5–5.1)
PROT SERPL-MCNC: 5.6 G/DL (ref 6.4–8.2)
RBC # BLD AUTO: 3.54 M/UL (ref 3.8–5.2)
RBC MORPH BLD: ABNORMAL
SODIUM SERPL-SCNC: 119 MMOL/L (ref 136–145)
SODIUM SERPL-SCNC: 119 MMOL/L (ref 136–145)
WBC # BLD AUTO: 14.3 K/UL (ref 3.6–11)

## 2024-10-07 PROCEDURE — 82533 TOTAL CORTISOL: CPT

## 2024-10-07 PROCEDURE — 2580000003 HC RX 258: Performed by: NURSE PRACTITIONER

## 2024-10-07 PROCEDURE — 97535 SELF CARE MNGMENT TRAINING: CPT

## 2024-10-07 PROCEDURE — 6360000002 HC RX W HCPCS: Performed by: INTERNAL MEDICINE

## 2024-10-07 PROCEDURE — 97165 OT EVAL LOW COMPLEX 30 MIN: CPT

## 2024-10-07 PROCEDURE — 2500000003 HC RX 250 WO HCPCS: Performed by: HOSPITALIST

## 2024-10-07 PROCEDURE — 2580000003 HC RX 258: Performed by: HOSPITALIST

## 2024-10-07 PROCEDURE — 6360000002 HC RX W HCPCS: Performed by: HOSPITALIST

## 2024-10-07 PROCEDURE — 85025 COMPLETE CBC W/AUTO DIFF WBC: CPT

## 2024-10-07 PROCEDURE — 2060000000 HC ICU INTERMEDIATE R&B

## 2024-10-07 PROCEDURE — 6370000000 HC RX 637 (ALT 250 FOR IP): Performed by: NURSE PRACTITIONER

## 2024-10-07 PROCEDURE — 94760 N-INVAS EAR/PLS OXIMETRY 1: CPT

## 2024-10-07 PROCEDURE — 6370000000 HC RX 637 (ALT 250 FOR IP): Performed by: HOSPITALIST

## 2024-10-07 PROCEDURE — 2580000003 HC RX 258: Performed by: FAMILY MEDICINE

## 2024-10-07 PROCEDURE — 93306 TTE W/DOPPLER COMPLETE: CPT

## 2024-10-07 PROCEDURE — 80053 COMPREHEN METABOLIC PANEL: CPT

## 2024-10-07 PROCEDURE — 83735 ASSAY OF MAGNESIUM: CPT

## 2024-10-07 PROCEDURE — 36415 COLL VENOUS BLD VENIPUNCTURE: CPT

## 2024-10-07 PROCEDURE — 97116 GAIT TRAINING THERAPY: CPT

## 2024-10-07 PROCEDURE — 6370000000 HC RX 637 (ALT 250 FOR IP): Performed by: FAMILY MEDICINE

## 2024-10-07 PROCEDURE — 2700000000 HC OXYGEN THERAPY PER DAY

## 2024-10-07 PROCEDURE — 97161 PT EVAL LOW COMPLEX 20 MIN: CPT

## 2024-10-07 RX ORDER — POTASSIUM CHLORIDE 750 MG/1
40 TABLET, EXTENDED RELEASE ORAL ONCE
Status: COMPLETED | OUTPATIENT
Start: 2024-10-07 | End: 2024-10-07

## 2024-10-07 RX ORDER — 3% SODIUM CHLORIDE 3 G/100ML
30 INJECTION, SOLUTION INTRAVENOUS CONTINUOUS
Status: DISPENSED | OUTPATIENT
Start: 2024-10-07 | End: 2024-10-07

## 2024-10-07 RX ORDER — FUROSEMIDE 10 MG/ML
40 INJECTION INTRAMUSCULAR; INTRAVENOUS 2 TIMES DAILY
Status: DISCONTINUED | OUTPATIENT
Start: 2024-10-07 | End: 2024-10-10

## 2024-10-07 RX ADMIN — DOXYCYCLINE 100 MG: 100 INJECTION, POWDER, LYOPHILIZED, FOR SOLUTION INTRAVENOUS at 12:59

## 2024-10-07 RX ADMIN — ASPIRIN 81 MG CHEWABLE TABLET 81 MG: 81 TABLET CHEWABLE at 09:58

## 2024-10-07 RX ADMIN — VALSARTAN 80 MG: 40 TABLET, FILM COATED ORAL at 09:58

## 2024-10-07 RX ADMIN — DOXYCYCLINE 100 MG: 100 INJECTION, POWDER, LYOPHILIZED, FOR SOLUTION INTRAVENOUS at 22:11

## 2024-10-07 RX ADMIN — POTASSIUM CHLORIDE 40 MEQ: 750 TABLET, EXTENDED RELEASE ORAL at 19:04

## 2024-10-07 RX ADMIN — SODIUM CHLORIDE 30 ML/HR: 3 INJECTION, SOLUTION INTRAVENOUS at 04:22

## 2024-10-07 RX ADMIN — LEVOTHYROXINE SODIUM 75 MCG: 0.07 TABLET ORAL at 05:02

## 2024-10-07 RX ADMIN — WATER 1000 MG: 1 INJECTION INTRAMUSCULAR; INTRAVENOUS; SUBCUTANEOUS at 12:55

## 2024-10-07 RX ADMIN — ERYTHROMYCIN: 5 OINTMENT OPHTHALMIC at 05:03

## 2024-10-07 RX ADMIN — BENZONATATE 100 MG: 100 CAPSULE ORAL at 23:44

## 2024-10-07 RX ADMIN — SODIUM CHLORIDE, PRESERVATIVE FREE 10 ML: 5 INJECTION INTRAVENOUS at 22:15

## 2024-10-07 RX ADMIN — BENZONATATE 100 MG: 100 CAPSULE ORAL at 05:02

## 2024-10-07 RX ADMIN — ERYTHROMYCIN: 5 OINTMENT OPHTHALMIC at 13:17

## 2024-10-07 RX ADMIN — FUROSEMIDE 40 MG: 10 INJECTION, SOLUTION INTRAMUSCULAR; INTRAVENOUS at 13:16

## 2024-10-07 RX ADMIN — ERYTHROMYCIN: 5 OINTMENT OPHTHALMIC at 22:15

## 2024-10-07 RX ADMIN — SODIUM CHLORIDE, PRESERVATIVE FREE 10 ML: 5 INJECTION INTRAVENOUS at 09:58

## 2024-10-07 RX ADMIN — FUROSEMIDE 40 MG: 10 INJECTION, SOLUTION INTRAMUSCULAR; INTRAVENOUS at 19:04

## 2024-10-07 RX ADMIN — NADOLOL 40 MG: 40 TABLET ORAL at 09:58

## 2024-10-07 ASSESSMENT — PAIN SCALES - GENERAL
PAINLEVEL_OUTOF10: 0

## 2024-10-07 NOTE — PROGRESS NOTES
Na @ 2154 - 119 from 116. No new orders placed  Farheen @ 0212 - stagnated at 119. Re-ordered 3% at 30ml/hr, total 90mL

## 2024-10-07 NOTE — PROGRESS NOTES
Probable right upper lobe pneumonia.  Patient with preceding cough and other respiratory symptoms.  Flu and COVID-negative.  Start ceftriaxone and doxycycline.  CT also suggestive of some pulm edema, small bilateral pleural effusion.  Echocardiogram completed, report pending.    Acute urine retention, Obrien catheter in place.  CT abdomen negative for hydronephrosis  Hypothyroidism, continue levothyroxine  Hypertension, uncontrolled: On Diovan     Code status: Full code  Prophylaxis: Lovenox  Care Plan discussed with: Patient, her friend/neighbor present in the room  Anticipated Disposition: > 4 8 hours, may need SNF.  Inpatient intermediate care  Cardiac monitoring: Telemetry  Central Line:            Social Determinants of Health     Tobacco Use: Low Risk  (10/5/2024)    Patient History     Smoking Tobacco Use: Never     Smokeless Tobacco Use: Never     Passive Exposure: Not on file   Alcohol Use: Not on file   Financial Resource Strain: Not on file   Food Insecurity: Not on file   Transportation Needs: Not on file   Physical Activity: Not on file   Stress: Not on file   Social Connections: Moderately Integrated (10/7/2024)    Social Connections ([facility] HRSN)     If for any reason you need help with day-to-day activities such as bathing, preparing meals, shopping, managing finances, etc., do you get the help you need?: Not on file   Intimate Partner Violence: Not on file   Depression: Not on file   Housing Stability: Not on file   Interpersonal Safety: Not At Risk (10/7/2024)    Interpersonal Safety Domain Source: IP Abuse Screening     Physical abuse: Denies     Verbal abuse: Denies     Emotional abuse: Denies     Financial abuse: Denies     Sexual abuse: Denies   Utilities: Not on file       Review of Systems:   Review of systems not obtained due to patient factors.       Vital Signs:    Last 24hrs VS reviewed since prior progress note. Most recent are:  Vitals:    10/07/24 1524   BP: 125/61   Pulse: 76   Resp: 18  erythromycin (ROMYCIN) ophthalmic ointment   Both Eyes 3 times per day    nadolol (CORGARD) tablet 40 mg  40 mg Oral Daily     ______________________________________________________________________  EXPECTED LENGTH OF STAY: 4  ACTUAL LENGTH OF STAY:          2                 Coni Sadler MD

## 2024-10-07 NOTE — CARE COORDINATION
Care Management Initial Assessment       RUR: 14%  Readmission? No  1st IM letter given? Yes - 10/5/2025  1st  letter given: No    CM spoke to son, Gume, via phone to verify demographics and insurance info. Pt currently lives alone in a two story home and she lives on the main floor. She is independent with ADLs and ambulation. She does not use DME and she does drive. Pt has no hx of SNF/HH/IPR. Gume is open to SNF if she needs PT/OT to get back to her baseline. He is also open to HH because he will be staying with her for temporary.     Pt son's live 4 hours away and the other lives in [place]. She has plenty of friends and neighbors to support her.    PCP verified.    CM will continue to follow.    Mony Kirkpatrick RN CM    [employer] [employer]     10/07/24 6735   Service Assessment   Patient Orientation Person;Place;Situation   Cognition Other (see comment)  (AMS)   History Provided By Child/Family   Primary Caregiver Self   Support Systems Children;Family Members;Friends/Neighbors;[place] [facility]   PCP Verified by CM Yes   Last Visit to PCP Within last 3 months   Prior Functional Level Independent in ADLs/IADLs   Current Functional Level Independent in ADLs/IADLs   Can patient return to prior living arrangement Unknown at present   Ability to make needs known: Good   Family able to assist with home care needs: Yes   Would you like for me to discuss the discharge plan with any other family members/significant others, and if so, who? Yes   Financial Resources Medicare   CM/SW Referral ADLs/IADLs   Social/Functional History   Lives With Alone   Type of Home House   Home Layout Two level;Able to Live on Main level with bedroom/bathroom   Home Equipment None   Receives Help From Friend(s);Neighbor   ADL Assistance Independent   Homemaking Assistance Independent   Homemaking Responsibilities Yes   Ambulation Assistance Independent   Transfer Assistance Independent   Active  Yes   Occupation Retired    Discharge Planning   Type of Residence House   Living Arrangements Alone   Potential Assistance Needed N/A   DME Ordered? No   Patient expects to be discharged to: House   Services At/After Discharge   Transition of Care Consult (CM Consult) Home Health;Discharge Planning;SNF   Services At/After Discharge DME;Skilled Nursing Facility (SNF);OT;PT   Mode of Transport at Discharge WC Van   Confirm Follow Up Transport Family   Condition of Participation: Discharge Planning   The Plan for Transition of Care is related to the following treatment goals: Home with HH vs SNF   The Patient and/or Patient Representative was provided with a Choice of Provider? Patient Representative   Name of the Patient Representative who was provided with the Choice of Provider and agrees with the Discharge Plan?  Gume   The Patient and/Or Patient Representative agree with the Discharge Plan? Yes   Freedom of Choice list was provided with basic dialogue that supports the patient's individualized plan of care/goals, treatment preferences, and shares the quality data associated with the providers?  Yes

## 2024-10-07 NOTE — PROGRESS NOTES
[address], [facility] [address]  Phone: [phone]   [phone]    [email or web address]     Nephrology Progress Note    Patient Name : Shila Hurst      : 1945     MRN : [id number]  Date of Admission : 10/5/2024  Date of Servive : 10/07/24    CC:  Follow up for Hyponatremia        Assessment and Plan   Hyponatremia   - acute vs acute on chronic   - clinically appears to be in CHF  - underlying SIADH has not been excluded   - TSH at goal.   - Am cortisol high, can recheck but doesnot appear to be cushings   - hold HCTZ  - No further 35 saline   - ordered Lasix 40 mg BID  -follow up echo   - labs Q8 hr   - keep fuentes for strict I.O    Hypokalemia   - resolving        Interval History:  Seen and examined. Ongoing cough and SOB. Echo pending   Na at 119. Received 3% saline overnight     Review of Systems: Pertinent items are noted in HPI.    Current Medications:   Current Facility-Administered Medications   Medication Dose Route Frequency    furosemide (LASIX) injection 40 mg  40 mg IntraVENous BID    sodium chloride flush 0.9 % injection 5-40 mL  5-40 mL IntraVENous 2 times per day    sodium chloride flush 0.9 % injection 5-40 mL  5-40 mL IntraVENous PRN    0.9 % sodium chloride infusion   IntraVENous PRN    ondansetron (ZOFRAN-ODT) disintegrating tablet 4 mg  4 mg Oral Q8H PRN    Or    ondansetron (ZOFRAN) injection 4 mg  4 mg IntraVENous Q6H PRN    polyethylene glycol (GLYCOLAX) packet 17 g  17 g Oral Daily PRN    levothyroxine (SYNTHROID) tablet 75 mcg  75 mcg Oral Daily    valsartan (DIOVAN) tablet 80 mg  80 mg Oral Daily    benzonatate (TESSALON) capsule 100 mg  100 mg Oral TID PRN    aspirin chewable tablet 81 mg  81 mg Oral Daily    cefTRIAXone (ROCEPHIN) 1,000 mg in sterile water 10 mL IV syringe  1,000 mg IntraVENous Q24H    doxycycline (VIBRAMYCIN) 100 mg in sodium chloride 0.9 % 100 mL IVPB (mini-bag)  100 mg IntraVENous Q12H

## 2024-10-07 NOTE — PROGRESS NOTES
2000 Received report from [facility].  2045 3% NS completed.  2145 BMP drawn.  2300 Na 119, renal notified, no new orders.  0200 Labs drawn.  0730 Bedside and Verbal shift change report given to Nicole (oncoming nurse) by naresh (offgoing nurse). Report included the following information Nurse Handoff Report, Adult Overview, Intake/Output, MAR, and Recent Results.

## 2024-10-07 NOTE — PLAN OF CARE
Problem: Occupational Therapy - Adult  Goal: By Discharge: Performs self-care activities at highest level of function for planned discharge setting.  See evaluation for individualized goals.  Description: FUNCTIONAL STATUS PRIOR TO ADMISSION: Patient independent with ADLs and functional mobility without device.    HOME SUPPORT: Patient lived alone.    Occupational Therapy Goals:  Initiated 10/7/2024  1.  Patient will perform grooming in standing for 5 minutes without LOB or fatigue with Supervision within 7 day(s).  2.  Patient will perform bathing with Supervision within 7 day(s).  3.  Patient will perform lower body dressing with Supervision within 7 day(s).  4.  Patient will perform toilet transfers with Supervision  within 7 day(s).  5.  Patient will perform all aspects of toileting with Supervision within 7 day(s).  6.  Patient will participate in upper extremity therapeutic exercise/activities with Supervision for 7 minutes within 7 day(s).    7.  Patient will utilize energy conservation techniques during functional activities with verbal cues within 7 day(s).    Outcome: Progressing     OCCUPATIONAL THERAPY EVALUATION    Patient: Shila Hurst (78 y.o. female)  Date: 10/7/2024  Primary Diagnosis: Acute hyponatremia [E87.1]  Hyponatremia [E87.1]  Altered mental status, unspecified altered mental status type [R41.82]         Precautions: Fall Risk                  ASSESSMENT :  The patient is limited by decreased functional mobility, independence in ADLs, high-level IADLs, strength, activity tolerance, endurance, safety awareness, cognition, balance s/p admission for AMS, hyponatremia, and acute hypoxia. Received on 2L but removed and maintained SpO2>94% on RA throughout evaluation.     Based on the impairments listed above patient presents below functional baseline, requires up to min A for ADLs and functional mobility without device. Patient tolerated mobility to bathroom for bowel incontinence hygiene,  Complexity: Patient may present with comorbidities that affect occupational performance. Minimal to moderate modifications of tasks or assist (eg. physical or verbal) with assist is necessary to enable pt to complete eval   Based on the above components, the patient evaluation is determined to be of the following complexity level: Medium

## 2024-10-07 NOTE — PLAN OF CARE
Problem: Discharge Planning  Goal: Discharge to home or other facility with appropriate resources  Outcome: Progressing  Flowsheets (Taken 10/7/2024 0753)  Discharge to home or other facility with appropriate resources:   Identify barriers to discharge with patient and caregiver   Arrange for needed discharge resources and transportation as appropriate     Problem: Safety - Adult  Goal: Free from fall injury  Outcome: Progressing  Flowsheets (Taken 10/7/2024 0753)  Free From Fall Injury: Instruct family/caregiver on patient safety     Problem: Chronic Conditions and Co-morbidities  Goal: Patient's chronic conditions and co-morbidity symptoms are monitored and maintained or improved  Outcome: Progressing  Flowsheets (Taken 10/7/2024 0753)  Care Plan - Patient's Chronic Conditions and Co-Morbidity Symptoms are Monitored and Maintained or Improved: Monitor and assess patient's chronic conditions and comorbid symptoms for stability, deterioration, or improvement     Problem: Pain  Goal: Verbalizes/displays adequate comfort level or baseline comfort level  Outcome: Progressing  Flowsheets  Taken 10/7/2024 1103  Verbalizes/displays adequate comfort level or baseline comfort level:   Encourage patient to monitor pain and request assistance   Assess pain using appropriate pain scale  Taken 10/7/2024 0753  Verbalizes/displays adequate comfort level or baseline comfort level:   Encourage patient to monitor pain and request assistance   Assess pain using appropriate pain scale

## 2024-10-07 NOTE — PLAN OF CARE
Problem: Physical Therapy - Adult  Goal: By Discharge: Performs mobility at highest level of function for planned discharge setting.  See evaluation for individualized goals.  Description: FUNCTIONAL STATUS PRIOR TO ADMISSION: Patient was independent and active without use of DME. Denies hx falls. Retired .     HOME SUPPORT PRIOR TO ADMISSION: The patient lived alone with no local support.    Physical Therapy Goals  Initiated 10/7/2024  1.  Patient will move from supine to sit and sit to supine in bed with supervision/set-up within 7 day(s).    2.  Patient will perform sit to stand with supervision/set-up within 7 day(s).  3.  Patient will transfer from bed to chair and chair to bed with supervision/set-up using the least restrictive device within 7 day(s).  4.  Patient will ambulate with supervision/set-up for 150 feet with the least restrictive device within 7 day(s).   5.  Patient will ascend/descend 4 stairs with bilateral handrail(s) with supervision/set-up within 7 day(s).    Outcome: Progressing   PHYSICAL THERAPY EVALUATION    Patient: Shila Hurst (78 y.o. female)  Date: 10/7/2024  Primary Diagnosis: Acute hyponatremia [E87.1]  Hyponatremia [E87.1]  Altered mental status, unspecified altered mental status type [R41.82]       Precautions: Restrictions/Precautions: Fall Risk                      ASSESSMENT :   DEFICITS/IMPAIRMENTS:   The patient is limited by decreased functional mobility, independence in ADLs, strength, activity tolerance, safety awareness, balance, and increased risk for falls in setting of hospital admission for SAVANNAH 2/2 acute severe hyponatremia and hypokalemia, as well as acute hypoxia likely 2/2 PNA per MD note. Patient received supine in bed, alert once awoken, and agreeable to PT. Patient verbalized orientation x 4 and followed all commands however appeared dazed/ demonstrated delayed processing at times during therapy session. Overall mobilizing OOB and ambulating  referral.  Laurence Coles, PT, DPT  Minutes: 24      Physical Therapy Evaluation Charge Determination   History Examination Presentation Decision-Making   MEDIUM  Complexity : 1-2 comorbidities / personal factors will impact the outcome/ POC  LOW Complexity : 1-2 Standardized tests and measures addressing body structure, function, activity limitation and / or participation in recreation  LOW Complexity : Stable, uncomplicated  AM-PAC  MEDIUM   Based on the above components, the patient evaluation is determined to be of the following complexity level: Low

## 2024-10-08 LAB
ANION GAP SERPL CALC-SCNC: 3 MMOL/L (ref 2–12)
ANION GAP SERPL CALC-SCNC: 9 MMOL/L (ref 2–12)
BASOPHILS # BLD: 0 K/UL (ref 0–0.1)
BASOPHILS NFR BLD: 0 % (ref 0–1)
BUN SERPL-MCNC: 11 MG/DL (ref 6–20)
BUN SERPL-MCNC: 12 MG/DL (ref 6–20)
BUN/CREAT SERPL: 16 (ref 12–20)
BUN/CREAT SERPL: 17 (ref 12–20)
CALCIUM SERPL-MCNC: 8.3 MG/DL (ref 8.5–10.1)
CALCIUM SERPL-MCNC: 8.4 MG/DL (ref 8.5–10.1)
CHLORIDE SERPL-SCNC: 85 MMOL/L (ref 97–108)
CHLORIDE SERPL-SCNC: 92 MMOL/L (ref 97–108)
CO2 SERPL-SCNC: 25 MMOL/L (ref 21–32)
CO2 SERPL-SCNC: 29 MMOL/L (ref 21–32)
COMMENT:: NORMAL
CREAT SERPL-MCNC: 0.66 MG/DL (ref 0.55–1.02)
CREAT SERPL-MCNC: 0.76 MG/DL (ref 0.55–1.02)
DIFFERENTIAL METHOD BLD: ABNORMAL
ECHO LV EF PHYSICIAN: 60 %
ECHO TV REGURGITANT PEAK GRADIENT: 41 MMHG
EOSINOPHIL # BLD: 0.2 K/UL (ref 0–0.4)
EOSINOPHIL NFR BLD: 2 % (ref 0–7)
ERYTHROCYTE [DISTWIDTH] IN BLOOD BY AUTOMATED COUNT: 11.5 % (ref 11.5–14.5)
GLUCOSE SERPL-MCNC: 112 MG/DL (ref 65–100)
GLUCOSE SERPL-MCNC: 169 MG/DL (ref 65–100)
HCT VFR BLD AUTO: 32.2 % (ref 35–47)
HGB BLD-MCNC: 11.2 G/DL (ref 11.5–16)
IMM GRANULOCYTES # BLD AUTO: 0 K/UL
IMM GRANULOCYTES NFR BLD AUTO: 0 %
LYMPHOCYTES # BLD: 2 K/UL (ref 0.8–3.5)
LYMPHOCYTES NFR BLD: 16 % (ref 12–49)
MAGNESIUM SERPL-MCNC: 1.6 MG/DL (ref 1.6–2.4)
MAGNESIUM SERPL-MCNC: 2.4 MG/DL (ref 1.6–2.4)
MCH RBC QN AUTO: 29.6 PG (ref 26–34)
MCHC RBC AUTO-ENTMCNC: 34.8 G/DL (ref 30–36.5)
MCV RBC AUTO: 85.2 FL (ref 80–99)
METAMYELOCYTES NFR BLD MANUAL: 1 %
MONOCYTES # BLD: 1 K/UL (ref 0–1)
MONOCYTES NFR BLD: 8 % (ref 5–13)
NEUTS SEG # BLD: 9.1 K/UL (ref 1.8–8)
NEUTS SEG NFR BLD: 73 % (ref 32–75)
NRBC # BLD: 0 K/UL (ref 0–0.01)
NRBC BLD-RTO: 0 PER 100 WBC
OSMOLALITY UR: 348 MOSM/KG H2O
PLATELET # BLD AUTO: 284 K/UL (ref 150–400)
PLATELET COMMENT: ABNORMAL
PMV BLD AUTO: 10.2 FL (ref 8.9–12.9)
POTASSIUM SERPL-SCNC: 3.2 MMOL/L (ref 3.5–5.1)
POTASSIUM SERPL-SCNC: 3.4 MMOL/L (ref 3.5–5.1)
POTASSIUM UR-SCNC: 29 MMOL/L
RBC # BLD AUTO: 3.78 M/UL (ref 3.8–5.2)
RBC MORPH BLD: ABNORMAL
SODIUM SERPL-SCNC: 117 MMOL/L (ref 136–145)
SODIUM SERPL-SCNC: 126 MMOL/L (ref 136–145)
SODIUM UR-SCNC: 13 MMOL/L
SPECIMEN HOLD: NORMAL
WBC # BLD AUTO: 12.4 K/UL (ref 3.6–11)
WBC MORPH BLD: ABNORMAL

## 2024-10-08 PROCEDURE — 6370000000 HC RX 637 (ALT 250 FOR IP): Performed by: INTERNAL MEDICINE

## 2024-10-08 PROCEDURE — 2580000003 HC RX 258: Performed by: HOSPITALIST

## 2024-10-08 PROCEDURE — 80048 BASIC METABOLIC PNL TOTAL CA: CPT

## 2024-10-08 PROCEDURE — 93306 TTE W/DOPPLER COMPLETE: CPT | Performed by: INTERNAL MEDICINE

## 2024-10-08 PROCEDURE — 6370000000 HC RX 637 (ALT 250 FOR IP): Performed by: NURSE PRACTITIONER

## 2024-10-08 PROCEDURE — 85025 COMPLETE CBC W/AUTO DIFF WBC: CPT

## 2024-10-08 PROCEDURE — 2060000000 HC ICU INTERMEDIATE R&B

## 2024-10-08 PROCEDURE — 51702 INSERT TEMP BLADDER CATH: CPT

## 2024-10-08 PROCEDURE — 6370000000 HC RX 637 (ALT 250 FOR IP): Performed by: HOSPITALIST

## 2024-10-08 PROCEDURE — 97116 GAIT TRAINING THERAPY: CPT

## 2024-10-08 PROCEDURE — 2500000003 HC RX 250 WO HCPCS: Performed by: HOSPITALIST

## 2024-10-08 PROCEDURE — 84133 ASSAY OF URINE POTASSIUM: CPT

## 2024-10-08 PROCEDURE — 36415 COLL VENOUS BLD VENIPUNCTURE: CPT

## 2024-10-08 PROCEDURE — 84300 ASSAY OF URINE SODIUM: CPT

## 2024-10-08 PROCEDURE — 97530 THERAPEUTIC ACTIVITIES: CPT

## 2024-10-08 PROCEDURE — 6370000000 HC RX 637 (ALT 250 FOR IP): Performed by: FAMILY MEDICINE

## 2024-10-08 PROCEDURE — 2580000003 HC RX 258: Performed by: FAMILY MEDICINE

## 2024-10-08 PROCEDURE — 83735 ASSAY OF MAGNESIUM: CPT

## 2024-10-08 PROCEDURE — 83935 ASSAY OF URINE OSMOLALITY: CPT

## 2024-10-08 PROCEDURE — 6360000002 HC RX W HCPCS: Performed by: NURSE PRACTITIONER

## 2024-10-08 PROCEDURE — 6360000002 HC RX W HCPCS: Performed by: HOSPITALIST

## 2024-10-08 RX ORDER — POTASSIUM CHLORIDE 750 MG/1
40 TABLET, EXTENDED RELEASE ORAL ONCE
Status: COMPLETED | OUTPATIENT
Start: 2024-10-08 | End: 2024-10-08

## 2024-10-08 RX ORDER — TOLVAPTAN 15 MG/1
15 TABLET ORAL ONCE
Status: COMPLETED | OUTPATIENT
Start: 2024-10-08 | End: 2024-10-08

## 2024-10-08 RX ORDER — MAGNESIUM SULFATE IN WATER 40 MG/ML
2000 INJECTION, SOLUTION INTRAVENOUS ONCE
Status: COMPLETED | OUTPATIENT
Start: 2024-10-08 | End: 2024-10-08

## 2024-10-08 RX ORDER — POTASSIUM CHLORIDE 750 MG/1
10 TABLET, EXTENDED RELEASE ORAL ONCE
Status: COMPLETED | OUTPATIENT
Start: 2024-10-08 | End: 2024-10-08

## 2024-10-08 RX ADMIN — TOLVAPTAN 15 MG: 15 TABLET ORAL at 07:24

## 2024-10-08 RX ADMIN — NADOLOL 40 MG: 40 TABLET ORAL at 09:23

## 2024-10-08 RX ADMIN — VALSARTAN 80 MG: 40 TABLET, FILM COATED ORAL at 09:23

## 2024-10-08 RX ADMIN — SODIUM CHLORIDE: 9 INJECTION, SOLUTION INTRAVENOUS at 02:57

## 2024-10-08 RX ADMIN — SODIUM CHLORIDE, PRESERVATIVE FREE 10 ML: 5 INJECTION INTRAVENOUS at 09:24

## 2024-10-08 RX ADMIN — ERYTHROMYCIN: 5 OINTMENT OPHTHALMIC at 20:55

## 2024-10-08 RX ADMIN — POTASSIUM CHLORIDE 40 MEQ: 750 TABLET, EXTENDED RELEASE ORAL at 20:54

## 2024-10-08 RX ADMIN — POTASSIUM CHLORIDE 10 MEQ: 750 TABLET, EXTENDED RELEASE ORAL at 02:55

## 2024-10-08 RX ADMIN — ERYTHROMYCIN: 5 OINTMENT OPHTHALMIC at 06:29

## 2024-10-08 RX ADMIN — DOXYCYCLINE 100 MG: 100 INJECTION, POWDER, LYOPHILIZED, FOR SOLUTION INTRAVENOUS at 12:20

## 2024-10-08 RX ADMIN — ASPIRIN 81 MG CHEWABLE TABLET 81 MG: 81 TABLET CHEWABLE at 09:23

## 2024-10-08 RX ADMIN — LEVOTHYROXINE SODIUM 75 MCG: 0.07 TABLET ORAL at 06:28

## 2024-10-08 RX ADMIN — ERYTHROMYCIN: 5 OINTMENT OPHTHALMIC at 12:21

## 2024-10-08 RX ADMIN — WATER 1000 MG: 1 INJECTION INTRAMUSCULAR; INTRAVENOUS; SUBCUTANEOUS at 12:20

## 2024-10-08 RX ADMIN — DOXYCYCLINE 100 MG: 100 INJECTION, POWDER, LYOPHILIZED, FOR SOLUTION INTRAVENOUS at 22:47

## 2024-10-08 RX ADMIN — MAGNESIUM SULFATE HEPTAHYDRATE 2000 MG: 40 INJECTION, SOLUTION INTRAVENOUS at 02:59

## 2024-10-08 RX ADMIN — SODIUM CHLORIDE, PRESERVATIVE FREE 10 ML: 5 INJECTION INTRAVENOUS at 20:54

## 2024-10-08 ASSESSMENT — PAIN SCALES - GENERAL
PAINLEVEL_OUTOF10: 0
PAINLEVEL_OUTOF10: 0

## 2024-10-08 NOTE — PROGRESS NOTES
2330 Received report from Nicole and assumed care.   0000 Incontinent of stool, complete chlorhexidine bed bath provided with linen change.  0110 Labs drawn.   0400 Na 117, renal notified. Urine collected and electrolytes replaced.  0730 Bedside and Verbal shift change report given to Nicole (oncoming nurse) by Lizzy   (offgoing nurse). Report included the following information Nurse Handoff Report, Adult Overview, Intake/Output, MAR, and Recent Results.

## 2024-10-08 NOTE — PROGRESS NOTES
0100 lab draw: Na dropped from 119 to 117.  Patient has voided 2L since the afternoon.   Reordered urine lytes.

## 2024-10-08 NOTE — PROGRESS NOTES
[facility]                                                                                          Hospitalist Progress Note  Coni Sadler MD  Office Phone: [phone]        Date of Service:  10/8/2024  NAME:  Shila Hurst  :  1945  MRN:  [id number]       Admission Summary:   Patient sitting in a chair by the bedside, sitter in the room.  Patient much more alert and clearer today, she says she still does not feel herself.  Sodium stagnated below 120 despite 3% saline yesterday and early this morning.            Interval history / Subjective:   Follow-up for severe hyponatremia.  Acute metabolic encephalopathy  Patient's mental status has cleared, she was seen sitting in a chair by the bedside, her son present in the room, updated.          Assessment & Plan:     Severe hyponatremia, acute, symptomatic.  The hyponatremia is caused most probably by limited oral intake, use of thiazide diuretics (she is on losartan HCTZ)  Sodium 114 on admission, stagnated around 119  Acute metabolic encephalopathy due to the above, dehydration  -Head CT negative.  Discontinue HCTZ  -Patient had received some saline in the ED  - Status post 3% saline.  Sodium stagnated around 119.  Further management per neurologist, started on IV Lasix.  -10/8, , patient received tolvaptan    Hypokalemia, replaced and corrected.    Nausea and vomiting associated with mild elevated LFTs.  -CT A/P unremarkable except cholelithiasis without evidence of cholecystitis, CBD dilatation.  LFT elevation likely due to dehydration versus recent viral infection.  Flu and COVID test negative    Hypoxia.  Leukocytosis without other manifestation of sepsis.  Probable right upper lobe pneumonia.  Patient with preceding cough and other respiratory symptoms.  Flu and COVID-negative.  Start ceftriaxone and doxycycline.  CT also suggestive of some pulm edema, small bilateral pleural effusion.   3                 Coni Sadler MD

## 2024-10-08 NOTE — CARE COORDINATION
Transition of Care Plan:    RUR: 12%  Prior Level of Functioning: independent  Disposition: TO IPR. Referral sent to [facility]/ IPR 10/8/24.  If SNF or IPR: Date FOC offered: 10/8/24  Date FOC received:   Accepting facility:   Date authorization started with reference number:   Date authorization received and expires: N/A  Follow up appointments:   DME needed: None  Transportation at discharge: Probable BLS  IM/IMM Medicare/ letter given: Yes 10/7/24  Is patient a [place] and connected with [facility]?    If yes, was  transfer form completed and [facility] notified?   Caregiver Contact: trevor Ojeda [phone]  Discharge Caregiver contacted prior to discharge?   Care Conference needed?   Barriers to discharge:     CM reviewed case with treatment team during IMCU Interdisciplinary Rounds. Patient continues medically unstable. Per hospitalist, plan will be for IPR. Hospitalist directed CM to start the referral process.    CM met the trevor Dunaway, and choice of providers was given, Trevor selected [facility]. CM sent referral via [facility] to [facility], and response is pending.

## 2024-10-08 NOTE — PROGRESS NOTES
[address], [place] [address]  Phone: [phone]   [phone]    [email or web address]     Nephrology Progress Note    Patient Name : Shila Hurst      : 1945     MRN : [id number]  Date of Admission : 10/5/2024  Date of Servive : 10/08/24    CC:  Follow up for Hyponatremia        Assessment and Plan   Hyponatremia   - suspect acute on chronic   - HCTZ +/- underlying SIADH  - clinically in CHF- echo pending   - hold further lasix   - ordered Tolvaptan 15 mg today  - labs Q8 hr   - keep fuentes     Hypokalemia   - resolving     CAD hx of CABG x3 25 years ago   Acute CHF   - echo pending     RUL PNA  Recent COVID./Flu vaccine     Hypothyroidism     HTN : continue diovan      Interval History:  Seen and examined. Discussed w. Son at bedside. Reports hx of CABG by Dr Cintron and f/b Dr Stovall with negative stress test with in the past year   Had covid and flu vaccinations prior to presentation   Also some of the confusion has been ongoing for several months   Na dropped overnight   ECHO still not reported     Review of Systems: Pertinent items are noted in HPI.    Current Medications:   Current Facility-Administered Medications   Medication Dose Route Frequency    [Held by provider] furosemide (LASIX) injection 40 mg  40 mg IntraVENous BID    sodium chloride flush 0.9 % injection 5-40 mL  5-40 mL IntraVENous 2 times per day    sodium chloride flush 0.9 % injection 5-40 mL  5-40 mL IntraVENous PRN    0.9 % sodium chloride infusion   IntraVENous PRN    ondansetron (ZOFRAN-ODT) disintegrating tablet 4 mg  4 mg Oral Q8H PRN    Or    ondansetron (ZOFRAN) injection 4 mg  4 mg IntraVENous Q6H PRN    polyethylene glycol (GLYCOLAX) packet 17 g  17 g Oral Daily PRN    levothyroxine (SYNTHROID) tablet 75 mcg  75 mcg Oral Daily    valsartan (DIOVAN) tablet 80 mg  80 mg Oral Daily    benzonatate (TESSALON) capsule 100 mg  100 mg Oral TID PRN    aspirin

## 2024-10-08 NOTE — PLAN OF CARE
Problem: Physical Therapy - Adult  Goal: By Discharge: Performs mobility at highest level of function for planned discharge setting.  See evaluation for individualized goals.  Description: FUNCTIONAL STATUS PRIOR TO ADMISSION: Patient was independent and active without use of DME. Denies hx falls. Retired .     HOME SUPPORT PRIOR TO ADMISSION: The patient lived alone with no local support.    Physical Therapy Goals  Initiated 10/7/2024  1.  Patient will move from supine to sit and sit to supine in bed with supervision/set-up within 7 day(s).    2.  Patient will perform sit to stand with supervision/set-up within 7 day(s).  3.  Patient will transfer from bed to chair and chair to bed with supervision/set-up using the least restrictive device within 7 day(s).  4.  Patient will ambulate with supervision/set-up for 150 feet with the least restrictive device within 7 day(s).   5.  Patient will ascend/descend 4 stairs with bilateral handrail(s) with supervision/set-up within 7 day(s).    Outcome: Progressing   PHYSICAL THERAPY TREATMENT    Patient: Shila Hurst (78 y.o. female)  Date: 10/8/2024  Diagnosis: Acute hyponatremia [E87.1]  Hyponatremia [E87.1]  Altered mental status, unspecified altered mental status type [R41.82] Acute hyponatremia      Precautions: Fall Risk                      ASSESSMENT:  Patient continues to benefit from skilled PT services and is progressing towards goals. Patient received semi-fowlers in bed, alert, and agreeable to PT. Patient continues to be oriented x 4 however demonstrates delayed command processing and limited short term memory in regards to safety instructions (I.e. sequencing with rollator). Ambulation progressed into hallway and noted more consistent gait instability including minor path deviations and occasional moderate LOB requiring assist to prevent fall without AD (per patient's baseline). Introduced rollator with improvements in gait stability, however

## 2024-10-08 NOTE — PROGRESS NOTES
Last Na 117 meq   Hold Lasix until Echo results   Ordered Tolvaptan for Hypervolemic Hyponatremia   Q8 hr labs       Dilia Dwyer MD  [facility]  Office :[phone]  Fax: [phone]

## 2024-10-08 NOTE — PROGRESS NOTES
Referral source:   Shila Hurst at [facility] in [facility] 4 IM.  attended rounds in the IMCU as part of the Interdisciplinary team where the patient's ongoing care was discussed. I reviewed the medical record as part of this encounter.     Outcome: Interdisciplinary team are aware of  availability and were encouraged to request support as needed.      Advised nurse to contact [facility] for any further referrals.The  on-call can be reached at [phone]).     Rev. Malina Alcantara MDiv, [facility]  Staff

## 2024-10-08 NOTE — PLAN OF CARE
Problem: Discharge Planning  Goal: Discharge to home or other facility with appropriate resources  10/8/2024 1018 by Nicole Linares RN  Outcome: Progressing  Flowsheets (Taken 10/8/2024 0930)  Discharge to home or other facility with appropriate resources:   Identify barriers to discharge with patient and caregiver   Arrange for needed discharge resources and transportation as appropriate  10/8/2024 0147 by Lizzy Grier RN  Outcome: Progressing  Flowsheets (Taken 10/8/2024 0000)  Discharge to home or other facility with appropriate resources: Identify barriers to discharge with patient and caregiver     Problem: Safety - Adult  Goal: Free from fall injury  10/8/2024 1018 by Nicole Linares RN  Outcome: Progressing  10/8/2024 0147 by Lizzy Grier RN  Outcome: Progressing     Problem: Chronic Conditions and Co-morbidities  Goal: Patient's chronic conditions and co-morbidity symptoms are monitored and maintained or improved  10/8/2024 1018 by Nicole Linares RN  Outcome: Progressing  Flowsheets (Taken 10/8/2024 0930)  Care Plan - Patient's Chronic Conditions and Co-Morbidity Symptoms are Monitored and Maintained or Improved: Monitor and assess patient's chronic conditions and comorbid symptoms for stability, deterioration, or improvement  10/8/2024 0147 by Lizzy Grier RN  Outcome: Progressing  Flowsheets (Taken 10/8/2024 0000)  Care Plan - Patient's Chronic Conditions and Co-Morbidity Symptoms are Monitored and Maintained or Improved: Monitor and assess patient's chronic conditions and comorbid symptoms for stability, deterioration, or improvement     Problem: Pain  Goal: Verbalizes/displays adequate comfort level or baseline comfort level  10/8/2024 1018 by Nicole Linares RN  Outcome: Progressing  Flowsheets  Taken 10/8/2024 0930 by Nicole Linares RN  Verbalizes/displays adequate comfort level or baseline comfort level:   Encourage patient to monitor pain and request assistance   Assess pain using appropriate pain

## 2024-10-08 NOTE — PROGRESS NOTES
Referral source:   Shila Hurst at [facility] in [facility] 4 IM.  attended rounds in the IMCU as part of the Interdisciplinary team where the patient's ongoing care was discussed. I reviewed the medical record as part of this encounter.     Outcome: Interdisciplinary team are aware of  availability and were encouraged to request support as needed.      Advised nurse to contact [facility] for any further referrals.The  on-call can be reached at [phone]).     Rev. Malina Alcantara MDiv, [facility]  Staff

## 2024-10-09 LAB
ALBUMIN SERPL-MCNC: 3 G/DL (ref 3.5–5)
ANION GAP SERPL CALC-SCNC: 7 MMOL/L (ref 2–12)
ANION GAP SERPL CALC-SCNC: 9 MMOL/L (ref 2–12)
ANION GAP SERPL CALC-SCNC: 9 MMOL/L (ref 2–12)
BUN SERPL-MCNC: 10 MG/DL (ref 6–20)
BUN SERPL-MCNC: 10 MG/DL (ref 6–20)
BUN SERPL-MCNC: 9 MG/DL (ref 6–20)
BUN/CREAT SERPL: 12 (ref 12–20)
BUN/CREAT SERPL: 12 (ref 12–20)
BUN/CREAT SERPL: 13 (ref 12–20)
CALCIUM SERPL-MCNC: 8.7 MG/DL (ref 8.5–10.1)
CALCIUM SERPL-MCNC: 9.2 MG/DL (ref 8.5–10.1)
CALCIUM SERPL-MCNC: 9.2 MG/DL (ref 8.5–10.1)
CHLORIDE SERPL-SCNC: 100 MMOL/L (ref 97–108)
CHLORIDE SERPL-SCNC: 97 MMOL/L (ref 97–108)
CHLORIDE SERPL-SCNC: 97 MMOL/L (ref 97–108)
CO2 SERPL-SCNC: 25 MMOL/L (ref 21–32)
CO2 SERPL-SCNC: 25 MMOL/L (ref 21–32)
CO2 SERPL-SCNC: 26 MMOL/L (ref 21–32)
CREAT SERPL-MCNC: 0.67 MG/DL (ref 0.55–1.02)
CREAT SERPL-MCNC: 0.83 MG/DL (ref 0.55–1.02)
CREAT SERPL-MCNC: 0.83 MG/DL (ref 0.55–1.02)
ERYTHROCYTE [DISTWIDTH] IN BLOOD BY AUTOMATED COUNT: 11.7 % (ref 11.5–14.5)
GLUCOSE SERPL-MCNC: 107 MG/DL (ref 65–100)
GLUCOSE SERPL-MCNC: 155 MG/DL (ref 65–100)
GLUCOSE SERPL-MCNC: 158 MG/DL (ref 65–100)
HCT VFR BLD AUTO: 33.9 % (ref 35–47)
HGB BLD-MCNC: 11.7 G/DL (ref 11.5–16)
MCH RBC QN AUTO: 29.8 PG (ref 26–34)
MCHC RBC AUTO-ENTMCNC: 34.5 G/DL (ref 30–36.5)
MCV RBC AUTO: 86.3 FL (ref 80–99)
NRBC # BLD: 0 K/UL (ref 0–0.01)
NRBC BLD-RTO: 0 PER 100 WBC
PHOSPHATE SERPL-MCNC: 2.4 MG/DL (ref 2.6–4.7)
PLATELET # BLD AUTO: 296 K/UL (ref 150–400)
PMV BLD AUTO: 10 FL (ref 8.9–12.9)
POTASSIUM SERPL-SCNC: 3.9 MMOL/L (ref 3.5–5.1)
POTASSIUM SERPL-SCNC: 3.9 MMOL/L (ref 3.5–5.1)
POTASSIUM SERPL-SCNC: 4 MMOL/L (ref 3.5–5.1)
RBC # BLD AUTO: 3.93 M/UL (ref 3.8–5.2)
SODIUM SERPL-SCNC: 131 MMOL/L (ref 136–145)
SODIUM SERPL-SCNC: 131 MMOL/L (ref 136–145)
SODIUM SERPL-SCNC: 133 MMOL/L (ref 136–145)
WBC # BLD AUTO: 13.2 K/UL (ref 3.6–11)

## 2024-10-09 PROCEDURE — 6370000000 HC RX 637 (ALT 250 FOR IP): Performed by: FAMILY MEDICINE

## 2024-10-09 PROCEDURE — 97116 GAIT TRAINING THERAPY: CPT

## 2024-10-09 PROCEDURE — 80048 BASIC METABOLIC PNL TOTAL CA: CPT

## 2024-10-09 PROCEDURE — 6360000002 HC RX W HCPCS: Performed by: NURSE PRACTITIONER

## 2024-10-09 PROCEDURE — 87449 NOS EACH ORGANISM AG IA: CPT

## 2024-10-09 PROCEDURE — 6370000000 HC RX 637 (ALT 250 FOR IP): Performed by: NURSE PRACTITIONER

## 2024-10-09 PROCEDURE — 6370000000 HC RX 637 (ALT 250 FOR IP): Performed by: HOSPITALIST

## 2024-10-09 PROCEDURE — 2580000003 HC RX 258: Performed by: HOSPITALIST

## 2024-10-09 PROCEDURE — 6360000002 HC RX W HCPCS: Performed by: HOSPITALIST

## 2024-10-09 PROCEDURE — 2580000003 HC RX 258: Performed by: NURSE PRACTITIONER

## 2024-10-09 PROCEDURE — 92610 EVALUATE SWALLOWING FUNCTION: CPT

## 2024-10-09 PROCEDURE — 85027 COMPLETE CBC AUTOMATED: CPT

## 2024-10-09 PROCEDURE — 2580000003 HC RX 258: Performed by: FAMILY MEDICINE

## 2024-10-09 PROCEDURE — 97530 THERAPEUTIC ACTIVITIES: CPT

## 2024-10-09 PROCEDURE — 80069 RENAL FUNCTION PANEL: CPT

## 2024-10-09 PROCEDURE — 97535 SELF CARE MNGMENT TRAINING: CPT

## 2024-10-09 PROCEDURE — 2500000003 HC RX 250 WO HCPCS: Performed by: HOSPITALIST

## 2024-10-09 PROCEDURE — 2060000000 HC ICU INTERMEDIATE R&B

## 2024-10-09 PROCEDURE — 36415 COLL VENOUS BLD VENIPUNCTURE: CPT

## 2024-10-09 RX ORDER — DEXTROSE MONOHYDRATE 50 MG/ML
INJECTION, SOLUTION INTRAVENOUS CONTINUOUS
Status: DISCONTINUED | OUTPATIENT
Start: 2024-10-09 | End: 2024-10-10

## 2024-10-09 RX ORDER — LANOLIN ALCOHOL/MO/W.PET/CERES
3 CREAM (GRAM) TOPICAL NIGHTLY PRN
Status: DISCONTINUED | OUTPATIENT
Start: 2024-10-09 | End: 2024-10-11 | Stop reason: HOSPADM

## 2024-10-09 RX ADMIN — Medication 3 MG: at 23:46

## 2024-10-09 RX ADMIN — SODIUM CHLORIDE, PRESERVATIVE FREE 10 ML: 5 INJECTION INTRAVENOUS at 08:56

## 2024-10-09 RX ADMIN — ERYTHROMYCIN: 5 OINTMENT OPHTHALMIC at 07:47

## 2024-10-09 RX ADMIN — DESMOPRESSIN ACETATE 2 MCG: 4 INJECTION, SOLUTION INTRAVENOUS; SUBCUTANEOUS at 02:34

## 2024-10-09 RX ADMIN — DOXYCYCLINE 100 MG: 100 INJECTION, POWDER, LYOPHILIZED, FOR SOLUTION INTRAVENOUS at 13:12

## 2024-10-09 RX ADMIN — ERYTHROMYCIN: 5 OINTMENT OPHTHALMIC at 13:13

## 2024-10-09 RX ADMIN — DEXTROSE MONOHYDRATE: 50 INJECTION, SOLUTION INTRAVENOUS at 03:02

## 2024-10-09 RX ADMIN — BENZONATATE 100 MG: 100 CAPSULE ORAL at 04:51

## 2024-10-09 RX ADMIN — DOXYCYCLINE 100 MG: 100 INJECTION, POWDER, LYOPHILIZED, FOR SOLUTION INTRAVENOUS at 22:21

## 2024-10-09 RX ADMIN — ERYTHROMYCIN: 5 OINTMENT OPHTHALMIC at 20:59

## 2024-10-09 RX ADMIN — SODIUM CHLORIDE, PRESERVATIVE FREE 10 ML: 5 INJECTION INTRAVENOUS at 20:59

## 2024-10-09 RX ADMIN — LEVOTHYROXINE SODIUM 75 MCG: 0.07 TABLET ORAL at 07:30

## 2024-10-09 RX ADMIN — NADOLOL 40 MG: 40 TABLET ORAL at 08:56

## 2024-10-09 RX ADMIN — ASPIRIN 81 MG CHEWABLE TABLET 81 MG: 81 TABLET CHEWABLE at 08:56

## 2024-10-09 RX ADMIN — WATER 1000 MG: 1 INJECTION INTRAMUSCULAR; INTRAVENOUS; SUBCUTANEOUS at 13:09

## 2024-10-09 RX ADMIN — VALSARTAN 80 MG: 40 TABLET, FILM COATED ORAL at 08:56

## 2024-10-09 NOTE — PLAN OF CARE
Problem: Discharge Planning  Goal: Discharge to home or other facility with appropriate resources  10/9/2024 1245 by Nicole Linares RN  Outcome: Progressing  Flowsheets (Taken 10/9/2024 0743)  Discharge to home or other facility with appropriate resources:   Identify barriers to discharge with patient and caregiver   Arrange for needed discharge resources and transportation as appropriate  10/9/2024 0141 by Landen Roque RN  Outcome: Progressing     Problem: Safety - Adult  Goal: Free from fall injury  10/9/2024 1245 by Nicole Linares RN  Outcome: Progressing  10/9/2024 0141 by Landen Roque RN  Outcome: Progressing     Problem: Chronic Conditions and Co-morbidities  Goal: Patient's chronic conditions and co-morbidity symptoms are monitored and maintained or improved  10/9/2024 1245 by Nicole Linares RN  Outcome: Progressing  Flowsheets (Taken 10/9/2024 0743)  Care Plan - Patient's Chronic Conditions and Co-Morbidity Symptoms are Monitored and Maintained or Improved: Monitor and assess patient's chronic conditions and comorbid symptoms for stability, deterioration, or improvement  10/9/2024 0141 by Landen Roque RN  Outcome: Progressing     Problem: Pain  Goal: Verbalizes/displays adequate comfort level or baseline comfort level  10/9/2024 1245 by Nicole Linares RN  Outcome: Progressing  10/9/2024 0141 by Landen Roque RN  Outcome: Progressing     Problem: Skin/Tissue Integrity  Goal: Absence of new skin breakdown  Description: 1.  Monitor for areas of redness and/or skin breakdown  2.  Assess vascular access sites hourly  3.  Every 4-6 hours minimum:  Change oxygen saturation probe site  4.  Every 4-6 hours:  If on nasal continuous positive airway pressure, respiratory therapy assess nares and determine need for appliance change or resting period.  10/9/2024 1245 by Nicole Linares RN  Outcome: Progressing  10/9/2024 0141 by Landen Roque RN  Outcome: Progressing

## 2024-10-09 NOTE — PROGRESS NOTES
Speech LAnguage Pathology EVALUATION/DISCHARGE    Patient: Shila Hurst (78 y.o. female)  Date: 10/9/2024  Primary Diagnosis: Acute hyponatremia [E87.1]  Hyponatremia [E87.1]  Altered mental status, unspecified altered mental status type [R41.82]       Precautions:  Fall Risk                  ASSESSMENT :  Patient seen for bedside swallow evaluation. Patient reportedly appears to have an effortful swallow and states that food feels \"tight\" going down. No overt difficulty nor overt s/s of aspiration present with any consistencies and pt reports that this has been improved. Given that pulmonary work-up has been largely negative and given pt reports of difficulty with food, am wondering if pt may have reflux that is contributing to this overall presentation. Discussed with Dr. Sadler with recommendations to consider medical management of reflux. Otherwise, no further acute SLP needs anticipated. Will sign off.     Patient will be discharged from skilled speech-language pathology services at this time.     PLAN :  Recommendations and Planned Interventions:  Diet: Regular and thin liquids  Consider medical management of reflux  General esophageal/reflux precautions       Acute SLP Services: No, patient will be discharged from acute skilled speech-language pathology at this time.  Discharge Recommendations: Yes, recommend SLP treatment at next level of care possibly for cognition     SUBJECTIVE:   Patient stated, “I'll have to think about that,” regarding reflux medicine. Appeared confused.     OBJECTIVE:     Past Medical History:   Diagnosis Date    Hypertension    History reviewed. No pertinent surgical history.  Prior Level of Function/Home Situation:   Social/Functional History  Lives With: Alone  Type of Home: House  Home Layout: Two level, Able to Live on Main level with bedroom/bathroom  Home Access: Stairs to enter with rails  Entrance Stairs - Number of Steps: 3  Entrance Stairs - Rails: Both  Bathroom

## 2024-10-09 NOTE — PLAN OF CARE
Problem: Physical Therapy - Adult  Goal: By Discharge: Performs mobility at highest level of function for planned discharge setting.  See evaluation for individualized goals.  Description: FUNCTIONAL STATUS PRIOR TO ADMISSION: Patient was independent and active without use of DME. Denies hx falls. Retired .     HOME SUPPORT PRIOR TO ADMISSION: The patient lived alone with no local support.    Physical Therapy Goals  Initiated 10/7/2024  1.  Patient will move from supine to sit and sit to supine in bed with supervision/set-up within 7 day(s).    2.  Patient will perform sit to stand with supervision/set-up within 7 day(s).  3.  Patient will transfer from bed to chair and chair to bed with supervision/set-up using the least restrictive device within 7 day(s).  4.  Patient will ambulate with supervision/set-up for 150 feet with the least restrictive device within 7 day(s).   5.  Patient will ascend/descend 4 stairs with bilateral handrail(s) with supervision/set-up within 7 day(s).    Outcome: Progressing   PHYSICAL THERAPY TREATMENT    Patient: Shila Hurst (78 y.o. female)  Date: 10/9/2024  Diagnosis: Acute hyponatremia [E87.1]  Hyponatremia [E87.1]  Altered mental status, unspecified altered mental status type [R41.82] Acute hyponatremia      Precautions: Fall Risk                      ASSESSMENT:  Patient continues to benefit from skilled PT services and is progressing towards goals. Patient received seated in chair, alert and oriented x 3, and agreeable to PT. Patient's overall cognition appears clearer however patient continues to demonstrate poor short and long term memory - unclear baseline. Stability with tranfers, gait, and stair training is improving however patient continues to require occasional min A x 1 to stabilize with minor path deviations and increased trunk sway. All VSS throughout activity, patient on RA. Patient remains below independent and active PLOF, and as patient lives  alone without adequate supervision for fall prevention, recommend further rehabilitation at high intensity/comprehensive skilled physical therapy in a multidisciplinary setting up to 3 hours of therapy/day 5-7x/week in order to maximize functional recovery.        PLAN:  Patient continues to benefit from skilled intervention to address the above impairments.  Continue treatment per established plan of care.    Recommend with staff: therapy recommendations for staff: Recommend mobility with staff assist x1 using gait belt and rolling walker.    Recommend for next PT session: stair training and further progression of gait with existing device    Recommendation for discharge: (in order for the patient to meet his/her long term goals):   High intensity/comprehensive skilled physical therapy in a multidisciplinary setting as patient is working towards tolerating up to 3 hours of therapy/day 5-7x/week vs  pending continued progress     Other factors to consider for discharge: lives alone, available support system works or is unable to provide adequate supervision and the patient would be alone, poor safety awareness, impaired cognition, high risk for falls, not safe to be alone, and concern for safely navigating or managing the home environment    IF patient discharges home will need the following DME: continuing to assess with progress       SUBJECTIVE:   Patient stated, \"I still can't remember anything from yesterday, I'm working to get my brain cells back.\"    OBJECTIVE DATA SUMMARY:   Critical Behavior:  Orientation  Overall Orientation Status: Impaired  Orientation Level: Oriented to person;Disoriented to situation;Disoriented to time;Disoriented to place  Cognition  Overall Cognitive Status: Exceptions  Arousal/Alertness: Appears intact  Following Commands: Follows one step commands consistently;Follows one step commands with increased time;Follows one step commands with repetition  Attention Span: Difficulty

## 2024-10-09 NOTE — PROGRESS NOTES
Nurse sent patient BMP down at 1:45pm.  Called the lab because it was still not showing up and  stated they couldn't find tube and would need redraw.

## 2024-10-09 NOTE — PROGRESS NOTES
[facility]                                                                                          Hospitalist Progress Note  Coni Sadler MD  Office Phone: [phone]        Date of Service:  10/9/2024  NAME:  Shila Hurst  :  1945  MRN:  [id number]       Admission Summary:   Patient sitting in a chair by the bedside, sitter in the room.  Patient much more alert and clearer today, she says she still does not feel herself.  Sodium stagnated below 120 despite 3% saline yesterday and early this morning.            Interval history / Subjective:   Follow-up for severe hyponatremia.  Acute metabolic encephalopathy  Patient sitting in a chair by the bedside, alert and oriented x 3.  No complaints.          Assessment & Plan:     Severe hyponatremia, acute, symptomatic.  The hyponatremia is caused most probably by limited oral intake, use of thiazide diuretics (she is on losartan HCTZ)  Sodium 114 on admission, stagnated around 119  Acute metabolic encephalopathy due to the above, dehydration  -Head CT negative.  Discontinue HCTZ  -Patient had received some saline in the ED  - Status post 3% saline.  Sodium stagnated around 119.  Further management per neurologist, started on IV Lasix.  -10/8, , patient received tolvaptan noted overcorrected and subsequently requiring DDAVP, D5.    Hypokalemia, replaced and corrected.    Nausea and vomiting associated with mild elevated LFTs.  -CT A/P unremarkable except cholelithiasis without evidence of cholecystitis, CBD dilatation.  LFT elevation likely due to dehydration versus recent viral infection.  Flu and COVID test negative    Hypoxia.  Leukocytosis without other manifestation of sepsis.  Probable right upper lobe pneumonia.  Patient with preceding cough and other respiratory symptoms.  Flu and COVID-negative.  Start ceftriaxone and doxycycline.  CT also suggestive of some pulm edema, small bilateral pleural  no

## 2024-10-09 NOTE — PLAN OF CARE
Problem: Occupational Therapy - Adult  Goal: By Discharge: Performs self-care activities at highest level of function for planned discharge setting.  See evaluation for individualized goals.  Description: FUNCTIONAL STATUS PRIOR TO ADMISSION: Patient independent with ADLs and functional mobility without device.    HOME SUPPORT: Patient lived alone.    Occupational Therapy Goals:  Initiated 10/7/2024  1.  Patient will perform grooming in standing for 5 minutes without LOB or fatigue with Supervision within 7 day(s).  2.  Patient will perform bathing with Supervision within 7 day(s).  3.  Patient will perform lower body dressing with Supervision within 7 day(s).  4.  Patient will perform toilet transfers with Supervision  within 7 day(s).  5.  Patient will perform all aspects of toileting with Supervision within 7 day(s).  6.  Patient will participate in upper extremity therapeutic exercise/activities with Supervision for 7 minutes within 7 day(s).    7.  Patient will utilize energy conservation techniques during functional activities with verbal cues within 7 day(s).    Outcome: Progressing     OCCUPATIONAL THERAPY TREATMENT  Patient: Shila Hurst (78 y.o. female)  Date: 10/9/2024  Primary Diagnosis: Acute hyponatremia [E87.1]  Hyponatremia [E87.1]  Altered mental status, unspecified altered mental status type [R41.82]       Precautions: Fall Risk                Chart, occupational therapy assessment, plan of care, and goals were reviewed.    ASSESSMENT  Patient continues to benefit from skilled OT services and is slowly progressing towards goals. AAOx1, disoriented to specific place, time, and situation. Per hte patient she has decreased awareness of happenings in the hospital. 1/3 recall for 3 words after 5 minutes, reviewed and 2/3 on second attempt. Pt with decreased motor planning, balance, problem solving, attention and memory today. She needed direct supervision and set/up for eating tasks, min A for  dressing and sit<>stand from toilet height. CGA to min A for HHA to bathroom and back. Pt lives alone and is typically independent and driving. The patient would benefit from continued acute care OT while in the hospital, concerns with pt dc home alone with intermittent family support.     VSS during therapy, slight drop but stable from standing to ambulating and back to sitting. See flowchart            PLAN :  Patient continues to benefit from skilled intervention to address the above impairments.  Continue treatment per established plan of care to address goals.    Recommend with staff: OOB    Recommend next OT session: follow POC    Recommendation for discharge: (in order for the patient to meet his/her long term goals):   High intensity/comprehensive skilled occupational therapy in a multidisciplinary setting as patient is working towards tolerating up to 3 hours of therapy/day 5-7x/week    Other factors to consider for discharge: lives alone, poor safety awareness, impaired cognition, and not safe to be alone    IF patient discharges home will need the following DME: continuing to assess with progress       SUBJECTIVE:   Patient stated “I don't remember anything about yesterday, I was in the stairs?”    OBJECTIVE DATA SUMMARY:   Cognitive/Behavioral Status:  Orientation  Overall Orientation Status: Impaired  Orientation Level: Oriented to person;Disoriented to situation;Disoriented to time;Disoriented to place  Cognition  Overall Cognitive Status: Exceptions  Arousal/Alertness: Appears intact  Following Commands: Follows one step commands consistently;Follows one step commands with increased time;Follows one step commands with repetition  Attention Span: Difficulty dividing attention  Memory: Impaired  Safety Judgement: Impaired;Decreased awareness of need for assistance;Decreased awareness of need for safety  Problem Solving: Impaired  Insights: Decreased awareness of deficits  Initiation: Requires cues for

## 2024-10-09 NOTE — PROGRESS NOTES
Spiritual Health History and Assessment/Progress Note  [facility]    Follow-up, Follow up,  ,      Name: Shila Hurst MRN: [id number]    Age: 78 y.o.     Sex: female   Language: English   Religious: Mandaeism   Acute hyponatremia     Date: 10/9/2024            Total Time Calculated: 33 min              Spiritual Assessment continued in [facility]        Referral/Consult From: Rounding   Encounter Overview/Reason: Follow-up  Service Provided For: Patient    Yanely, Belief, Meaning:   Patient identifies as spiritual  Family/Friends No family/friends present      Importance and Influence:  Patient has spiritual/personal beliefs that influence decisions regarding their health  Family/Friends No family/friends present    Community:  Patient feels well-supported. Support system includes: Children and Friends  Family/Friends No family/friends present    Assessment and Plan of Care:     Patient Interventions include: Affirmed coping skills/support systems  Family/Friends Interventions include: No family/friends present    Patient Plan of Care: Spiritual Care available upon further referral  Family/Friends Plan of Care: Spiritual Care available upon further referral    Electronically signed by Dwight Castellanos, Chaplain Resident on 10/9/2024 at 11:03 AM

## 2024-10-09 NOTE — PROGRESS NOTES
Nephrology    Midnight sodium noted - 133  -117->126->133  -Received Tolvaptan earlier in the day  -4L urine output over the past day  -Will start D5W infusion at 100ml/hr  -Give 2mcg DDAVP IV, unable to find in EMR, discussed with [facility] over the phone who will adjust order  -Repeat BMP within four hours, monitor urine output  -Plan discussed with Dr Washington, nephrology supervising physician    --[facility]

## 2024-10-09 NOTE — PLAN OF CARE
Problem: Skin/Tissue Integrity  Goal: Absence of new skin breakdown  Description: 1.  Monitor for areas of redness and/or skin breakdown  2.  Assess vascular access sites hourly  3.  Every 4-6 hours minimum:  Change oxygen saturation probe site  4.  Every 4-6 hours:  If on nasal continuous positive airway pressure, respiratory therapy assess nares and determine need for appliance change or resting period.  Outcome: Progressing     Problem: Pain  Goal: Verbalizes/displays adequate comfort level or baseline comfort level  Outcome: Progressing     Problem: Chronic Conditions and Co-morbidities  Goal: Patient's chronic conditions and co-morbidity symptoms are monitored and maintained or improved  Outcome: Progressing     Problem: Safety - Adult  Goal: Free from fall injury  Outcome: Progressing     Problem: Discharge Planning  Goal: Discharge to home or other facility with appropriate resources  Outcome: Progressing

## 2024-10-09 NOTE — PROGRESS NOTES
[address], [place] [address]  Phone: [phone]   [phone]    [email or web address]     Nephrology Progress Note    Patient Name : Shila Hurst      : 1945     MRN : [id number]  Date of Admission : 10/5/2024  Date of Servive : 10/09/24    CC:  Follow up for Hyponatremia        Assessment and Plan   Hyponatremia   - suspect acute on chronic   - chronic from HCTZ use   - acutely SIADH from resp illness   - ordered urine legionella screen for completeness sake   - improved/ over corrected w/ Tolvaptan : s/p DDAVP 2 mcg and D5W  - stopped D5W  - recheck labs. Goal Na ~ 130 today   - can d/c home tomorrow     Hypokalemia   -better    CAD hx of CABG x3 25 years ago   - echo preserved EF, mild TR, Pulm HTN     RUL PNA  Recent COVID./Flu vaccine     Hypothyroidism     HTN : continue diovan      Interval History:  Seen and examined. Much better after aquauresis and improvement in serum Na   Feels better  Confusion resolved  Removed fuentes this morning     Review of Systems: Pertinent items are noted in HPI.    Current Medications:   Current Facility-Administered Medications   Medication Dose Route Frequency    [Held by provider] dextrose 5 % solution   IntraVENous Continuous    [Held by provider] furosemide (LASIX) injection 40 mg  40 mg IntraVENous BID    sodium chloride flush 0.9 % injection 5-40 mL  5-40 mL IntraVENous 2 times per day    sodium chloride flush 0.9 % injection 5-40 mL  5-40 mL IntraVENous PRN    0.9 % sodium chloride infusion   IntraVENous PRN    ondansetron (ZOFRAN-ODT) disintegrating tablet 4 mg  4 mg Oral Q8H PRN    Or    ondansetron (ZOFRAN) injection 4 mg  4 mg IntraVENous Q6H PRN    polyethylene glycol (GLYCOLAX) packet 17 g  17 g Oral Daily PRN    levothyroxine (SYNTHROID) tablet 75 mcg  75 mcg Oral Daily    valsartan (DIOVAN) tablet 80 mg  80 mg Oral Daily    benzonatate (TESSALON) capsule 100 mg  100 mg Oral TID  PRN    aspirin chewable tablet 81 mg  81 mg Oral Daily    cefTRIAXone (ROCEPHIN) 1,000 mg in sterile water 10 mL IV syringe  1,000 mg IntraVENous Q24H    doxycycline (VIBRAMYCIN) 100 mg in sodium chloride 0.9 % 100 mL IVPB (mini-bag)  100 mg IntraVENous Q12H    erythromycin (ROMYCIN) ophthalmic ointment   Both Eyes 3 times per day    nadolol (CORGARD) tablet 40 mg  40 mg Oral Daily      Allergies   Allergen Reactions    Atorvastatin Calcium     Choline Fenofibrate     Clonidine     Ezetimibe     Simvastatin     Sulfamethoxazole     Sulfamethoxazole-Trimethoprim Other (See Comments)    Trimethoprim        Objective:  Vitals:    Vitals:    10/09/24 0303 10/09/24 0356 10/09/24 0555 10/09/24 0743   BP: (!) 138/58   (!) 154/72   Pulse: 78 77 81 66   Resp: 19   13   Temp: 98 °F (36.7 °C)   97.5 °F (36.4 °C)   TempSrc: Oral   Oral   SpO2: 95%   99%   Weight:       Height:         Intake and Output:  No intake/output data recorded.  10/07 1901 - 10/09 0700  In: 1129 [P.O.:920; I.V.:1.7]  Out: 6900 [Urine:6900]    Physical Examination:  General: NAD,Conversant   Neck:  Supple, no mass  Resp:  B/l rales +  CV:  RRR,  no murmur or rub, LE edema  GI:  Soft, NT, + BS, no HS megaly  Neurologic:  Non focal  Psych:             AAO x 3 appropriate affect   Skin:  No Rash  :  Obrien +     []    High complexity decision making was performed  []    Patient is at high-risk of decompensation with multiple organ involvement    Lab Data Personally Reviewed: I have reviewed all the pertinent labs, microbiology data and radiology studies during assessment.    Labs:  Recent Labs     10/08/24  0114 10/08/24  1520 10/09/24  0013   * 126* 133*   K 3.4* 3.2* 4.0   CL 85* 92* 100   CO2 29 25 26   GLUCOSE 112* 169* 107*   BUN 11 12 9   CREATININE 0.66 0.76 0.67   CALCIUM 8.3* 8.4* 8.7       Recent Labs     10/07/24  0212 10/08/24  1514 10/09/24  0013   WBC 14.3* 12.4* 13.2*   RBC 3.54* 3.78* 3.93   HGB 10.6* 11.2* 11.7   HCT 29.4* 32.2*

## 2024-10-10 LAB
ANION GAP SERPL CALC-SCNC: 6 MMOL/L (ref 2–12)
BUN SERPL-MCNC: 9 MG/DL (ref 6–20)
BUN/CREAT SERPL: 14 (ref 12–20)
CALCIUM SERPL-MCNC: 8.9 MG/DL (ref 8.5–10.1)
CHLORIDE SERPL-SCNC: 102 MMOL/L (ref 97–108)
CO2 SERPL-SCNC: 24 MMOL/L (ref 21–32)
CREAT SERPL-MCNC: 0.64 MG/DL (ref 0.55–1.02)
ERYTHROCYTE [DISTWIDTH] IN BLOOD BY AUTOMATED COUNT: 12.2 % (ref 11.5–14.5)
GLUCOSE SERPL-MCNC: 106 MG/DL (ref 65–100)
HCT VFR BLD AUTO: 33.7 % (ref 35–47)
HGB BLD-MCNC: 11.2 G/DL (ref 11.5–16)
MCH RBC QN AUTO: 29.7 PG (ref 26–34)
MCHC RBC AUTO-ENTMCNC: 33.2 G/DL (ref 30–36.5)
MCV RBC AUTO: 89.4 FL (ref 80–99)
NRBC # BLD: 0 K/UL (ref 0–0.01)
NRBC BLD-RTO: 0 PER 100 WBC
PLATELET # BLD AUTO: 302 K/UL (ref 150–400)
PMV BLD AUTO: 9.2 FL (ref 8.9–12.9)
POTASSIUM SERPL-SCNC: 4 MMOL/L (ref 3.5–5.1)
RBC # BLD AUTO: 3.77 M/UL (ref 3.8–5.2)
SODIUM SERPL-SCNC: 132 MMOL/L (ref 136–145)
WBC # BLD AUTO: 12.4 K/UL (ref 3.6–11)

## 2024-10-10 PROCEDURE — 97116 GAIT TRAINING THERAPY: CPT

## 2024-10-10 PROCEDURE — 6370000000 HC RX 637 (ALT 250 FOR IP): Performed by: HOSPITALIST

## 2024-10-10 PROCEDURE — 97535 SELF CARE MNGMENT TRAINING: CPT

## 2024-10-10 PROCEDURE — 80048 BASIC METABOLIC PNL TOTAL CA: CPT

## 2024-10-10 PROCEDURE — 6370000000 HC RX 637 (ALT 250 FOR IP): Performed by: INTERNAL MEDICINE

## 2024-10-10 PROCEDURE — 6360000002 HC RX W HCPCS: Performed by: HOSPITALIST

## 2024-10-10 PROCEDURE — 2060000000 HC ICU INTERMEDIATE R&B

## 2024-10-10 PROCEDURE — 85027 COMPLETE CBC AUTOMATED: CPT

## 2024-10-10 PROCEDURE — 2580000003 HC RX 258: Performed by: FAMILY MEDICINE

## 2024-10-10 PROCEDURE — 36415 COLL VENOUS BLD VENIPUNCTURE: CPT

## 2024-10-10 PROCEDURE — 2580000003 HC RX 258: Performed by: HOSPITALIST

## 2024-10-10 PROCEDURE — 97530 THERAPEUTIC ACTIVITIES: CPT

## 2024-10-10 PROCEDURE — 2500000003 HC RX 250 WO HCPCS: Performed by: HOSPITALIST

## 2024-10-10 PROCEDURE — 6370000000 HC RX 637 (ALT 250 FOR IP): Performed by: FAMILY MEDICINE

## 2024-10-10 RX ORDER — ACETAMINOPHEN 325 MG/1
650 TABLET ORAL EVERY 6 HOURS PRN
Status: DISCONTINUED | OUTPATIENT
Start: 2024-10-10 | End: 2024-10-11 | Stop reason: HOSPADM

## 2024-10-10 RX ORDER — VALSARTAN 40 MG/1
160 TABLET ORAL DAILY
Status: DISCONTINUED | OUTPATIENT
Start: 2024-10-10 | End: 2024-10-11 | Stop reason: HOSPADM

## 2024-10-10 RX ORDER — DOXYCYCLINE HYCLATE 100 MG
100 TABLET ORAL ONCE
Status: COMPLETED | OUTPATIENT
Start: 2024-10-10 | End: 2024-10-10

## 2024-10-10 RX ORDER — AMOXICILLIN AND CLAVULANATE POTASSIUM 500; 125 MG/1; MG/1
1 TABLET, FILM COATED ORAL EVERY 8 HOURS SCHEDULED
Status: DISCONTINUED | OUTPATIENT
Start: 2024-10-10 | End: 2024-10-11 | Stop reason: HOSPADM

## 2024-10-10 RX ADMIN — NADOLOL 40 MG: 40 TABLET ORAL at 09:03

## 2024-10-10 RX ADMIN — DOXYCYCLINE 100 MG: 100 INJECTION, POWDER, LYOPHILIZED, FOR SOLUTION INTRAVENOUS at 11:46

## 2024-10-10 RX ADMIN — AMOXICILLIN AND CLAVULANATE POTASSIUM 1 TABLET: 500; 125 TABLET, FILM COATED ORAL at 17:18

## 2024-10-10 RX ADMIN — ERYTHROMYCIN: 5 OINTMENT OPHTHALMIC at 20:44

## 2024-10-10 RX ADMIN — ASPIRIN 81 MG CHEWABLE TABLET 81 MG: 81 TABLET CHEWABLE at 09:03

## 2024-10-10 RX ADMIN — ERYTHROMYCIN: 5 OINTMENT OPHTHALMIC at 07:14

## 2024-10-10 RX ADMIN — LEVOTHYROXINE SODIUM 75 MCG: 0.07 TABLET ORAL at 07:14

## 2024-10-10 RX ADMIN — ACETAMINOPHEN 650 MG: 325 TABLET ORAL at 14:35

## 2024-10-10 RX ADMIN — ERYTHROMYCIN: 5 OINTMENT OPHTHALMIC at 14:35

## 2024-10-10 RX ADMIN — SODIUM CHLORIDE, PRESERVATIVE FREE 10 ML: 5 INJECTION INTRAVENOUS at 20:44

## 2024-10-10 RX ADMIN — SODIUM CHLORIDE, PRESERVATIVE FREE 10 ML: 5 INJECTION INTRAVENOUS at 09:02

## 2024-10-10 RX ADMIN — DOXYCYCLINE HYCLATE 100 MG: 100 TABLET, COATED ORAL at 20:41

## 2024-10-10 RX ADMIN — WATER 1000 MG: 1 INJECTION INTRAMUSCULAR; INTRAVENOUS; SUBCUTANEOUS at 11:49

## 2024-10-10 RX ADMIN — VALSARTAN 160 MG: 40 TABLET, FILM COATED ORAL at 09:02

## 2024-10-10 ASSESSMENT — PAIN DESCRIPTION - LOCATION: LOCATION: ARM

## 2024-10-10 ASSESSMENT — PAIN DESCRIPTION - DESCRIPTORS: DESCRIPTORS: ACHING;THROBBING

## 2024-10-10 ASSESSMENT — PAIN DESCRIPTION - ORIENTATION: ORIENTATION: RIGHT

## 2024-10-10 ASSESSMENT — PAIN SCALES - GENERAL: PAINLEVEL_OUTOF10: 3

## 2024-10-10 ASSESSMENT — PAIN - FUNCTIONAL ASSESSMENT: PAIN_FUNCTIONAL_ASSESSMENT: ACTIVITIES ARE NOT PREVENTED

## 2024-10-10 NOTE — CARE COORDINATION
Transition of Care Plan:    RUR: 11%  Prior Level of Functioning: independent  Disposition: IPR  If SNF or IPR: Date FOC offered: 10/8  Date FOC received: 10/8  Accepting facility: [facility]  Date authorization started with reference number: N  Date authorization received and expires: N  Follow up appointments: MD recommendations  DME needed: TBD  Transportation at discharge: BLS  IM/IMM Medicare/ letter given: 10/07  Caregiver Contact: Gume Ojeda / trevor / [phone]  Discharge Caregiver contacted prior to discharge? Y   Care Conference needed? N  Barriers to discharge: Medical stability    CM spoke to Gume murray, via phone to provide update on discharge diposition. Medical transport to be set up per request of son for safety purposes and his inability to transport her due to his distance.     [facility] / [facility] accepted pt and will need to DC virtual sitter for 24 hours. MD aware. CM will continue to follow.    Mony Kirkpatrick RN CM    [employer] [employer]

## 2024-10-10 NOTE — PLAN OF CARE
Problem: Discharge Planning  Goal: Discharge to home or other facility with appropriate resources  Outcome: Progressing  Flowsheets (Taken 10/10/2024 0900)  Discharge to home or other facility with appropriate resources:   Identify barriers to discharge with patient and caregiver   Arrange for needed discharge resources and transportation as appropriate     Problem: Safety - Adult  Goal: Free from fall injury  Outcome: Progressing     Problem: Chronic Conditions and Co-morbidities  Goal: Patient's chronic conditions and co-morbidity symptoms are monitored and maintained or improved  Outcome: Progressing  Flowsheets (Taken 10/10/2024 0900)  Care Plan - Patient's Chronic Conditions and Co-Morbidity Symptoms are Monitored and Maintained or Improved: Monitor and assess patient's chronic conditions and comorbid symptoms for stability, deterioration, or improvement     Problem: Pain  Goal: Verbalizes/displays adequate comfort level or baseline comfort level  Outcome: Progressing  Flowsheets (Taken 10/10/2024 0900)  Verbalizes/displays adequate comfort level or baseline comfort level:   Encourage patient to monitor pain and request assistance   Assess pain using appropriate pain scale     Problem: Skin/Tissue Integrity  Goal: Absence of new skin breakdown  Description: 1.  Monitor for areas of redness and/or skin breakdown  2.  Assess vascular access sites hourly  3.  Every 4-6 hours minimum:  Change oxygen saturation probe site  4.  Every 4-6 hours:  If on nasal continuous positive airway pressure, respiratory therapy assess nares and determine need for appliance change or resting period.  Outcome: Progressing

## 2024-10-10 NOTE — PROGRESS NOTES
Physician Progress Note      PATIENT:               MAYCOL BALL  CSN #:                  [id number]  :                       1945  ADMIT DATE:       10/5/2024 5:43 PM  DISCH DATE:  RESPONDING  PROVIDER #:        Coni Sadler MD          QUERY TEXT:    Patient admitted with hyponatremia. Noted documentation of \"acute respiratory   failure with hypoxia\" in H&P on 10/6 In order to support the diagnosis of   acute respiratory failure, please include additional clinical indicators in   your documentation.  Or please document if the diagnosis of acute respiratory   failure has been ruled out after further study.    The medical record reflects the following:  Risk Factors: Probable pneumonia  Clinical Indicators: H&P:  Acute respiratory failure with hypoxia  -O2 saturation decreased to 89% on room air  -Placed on 2 L O2 via nasal cannula  -Order CTA chest to rule out PE/occult pneumonia  -Continuous pulse oximetry monitoring  -Keep goal O2 saturation greater than 94%  10/6 Internal Medicine note:  Hypoxia.  Probable right upper lobe pneumonia.  Variable RR  Treatment: O2 2-3L NC; VS    Thank you,  Minal Fitzgerald RN/CCDS  [email or web address]  Options provided:  -- Acute Respiratory Failure as evidenced by, Please document evidence.  -- Acute Respiratory Failure ruled out after study, hypoxia only  -- Other - I will add my own diagnosis  -- Disagree - Not applicable / Not valid  -- Disagree - Clinically unable to determine / Unknown  -- Refer to Clinical Documentation Reviewer    PROVIDER RESPONSE TEXT:    Acute Respiratory Failure has been ruled out after study., hypoxia only    Query created by: Minal Fitzgerald on 10/7/2024 2:04 PM      QUERY TEXT:    Pt admitted with hyponatremia. Pt noted to have probable right upper lobe   pneumonia, WBCs 13.0, metabolic encephalopathy and HR >90 and RR > 20 withing   the first 24 hours of admission. If possible, please document in the progress   notes  and discharge summary if you are evaluating and /or treating any of the   following:    The medical record reflects the following:  Risk Factors: Pneumonia  Clinical Indicators: 10/06 Hypoxia.  Leukocytosis without other manifestation   of sepsis.  Probable right upper lobe pneumonia.  WBC 13.0  LA 1.4  procal 0.21  LA plasma 1.4  HR within fiirst 24 hrs 91, 90, 93, 95  Metabolic encephalopathy  Treatment: Rocephin; Vibramycin; l;abs; VS    Thank you,  Francy Fitzgerald RN/CCDS  [email or web address]  Options provided:  -- Sepsis, present on admission  -- Pneumonia without Sepsis  -- Other - I will add my own diagnosis  -- Disagree - Not applicable / Not valid  -- Disagree - Clinically unable to determine / Unknown  -- Refer to Clinical Documentation Reviewer    PROVIDER RESPONSE TEXT:    This patient has pneumonia without Sepsis.    Query created by: Francy Fitzgerald on 10/7/2024 2:19 PM      Electronically signed by:  Coni Sadler MD 10/10/2024 4:58 PM

## 2024-10-10 NOTE — PROGRESS NOTES
Clinical Pharmacy Note: Re: IV to PO Automatic Conversion - Antibiotic    Please note: Shila Hurst’s medication- doxycycline has been changed from IV to PO based on the following criteria:    The patient:  Received IV therapy for at least 24 hours   Is tolerating diet more advanced than clear liquids  Is tolerating oral medications  Is not on vasopressor blood pressure support (i.e. no signs of shock)      This IV to PO conversion is based on the P&T approved automatic conversion policy for eligible patients.  Please call with questions.  \   [[email or web address]] : buzzing in left ear denies pain vertigo or loss of hearing

## 2024-10-10 NOTE — PROGRESS NOTES
[address], [facility] [place] [address]  Phone: [phone]   [phone]    [email or web address]     Nephrology Progress Note    Patient Name : Shila Hurst      : 1945     MRN : [id number]  Date of Admission : 10/5/2024  Date of Servive : 10/10/24    CC:  Follow up for Hyponatremia        Assessment and Plan   Hyponatremia   - suspect acute on chronic   - chronic from HCTZ use   - acutely SIADH from resp illness   - f/u urine legionella screen for completeness sake   - ok for d/c with FR 1200 cc/ day, No HCTZ  - f/u with me in 2 weeks     Hypokalemia   -better    CAD hx of CABG x3 25 years ago   - echo preserved EF, mild TR, Pulm HTN     RUL PNA  Recent COVID./Flu vaccine     Hypothyroidism     HTN : increased Diovan dose      Interval History:  Seen and examined. BP elevated. Na stable. Imprioved resp sx.    Review of Systems: Pertinent items are noted in HPI.    Current Medications:   Current Facility-Administered Medications   Medication Dose Route Frequency    valsartan (DIOVAN) tablet 160 mg  160 mg Oral Daily    melatonin tablet 3 mg  3 mg Oral Nightly PRN    sodium chloride flush 0.9 % injection 5-40 mL  5-40 mL IntraVENous 2 times per day    sodium chloride flush 0.9 % injection 5-40 mL  5-40 mL IntraVENous PRN    0.9 % sodium chloride infusion   IntraVENous PRN    ondansetron (ZOFRAN-ODT) disintegrating tablet 4 mg  4 mg Oral Q8H PRN    Or    ondansetron (ZOFRAN) injection 4 mg  4 mg IntraVENous Q6H PRN    polyethylene glycol (GLYCOLAX) packet 17 g  17 g Oral Daily PRN    levothyroxine (SYNTHROID) tablet 75 mcg  75 mcg Oral Daily    benzonatate (TESSALON) capsule 100 mg  100 mg Oral TID PRN    aspirin chewable tablet 81 mg  81 mg Oral Daily    cefTRIAXone (ROCEPHIN) 1,000 mg in sterile water 10 mL IV syringe  1,000 mg IntraVENous Q24H    doxycycline (VIBRAMYCIN) 100 mg in sodium chloride 0.9 % 100 mL IVPB (mini-bag)  100 mg  \"GLOB\" in the last 72 hours.    Invalid input(s): \"SGOT\", \"GPT\", \"AP\", \"TBIL\", \"ALB\", \"AML\", \"AMYP\", \"LPSE\", \"LAC\"    No results for input(s): \"INR\", \"APTT\" in the last 72 hours.    Invalid input(s): \"PTP\"     No results for input(s): \"CPK\", \"CKMB\", \"TROPONINI\" in the last 72 hours.    Invalid input(s): \"B-NP\"  Invalid input(s): \"PHI\", \"PCO2I\", \"PO2I\", \"FIO2I\"     Ventilator:       Microbiology:  No results found for: \"SDES\"  No components found for: \"CULT\"      I have reviewed the flowsheets.  Chart and Pertinent Notes have been reviewed.   No change in PMH ,family and social history from Consult note.      Deamr Dwyer MD  [facility]

## 2024-10-10 NOTE — PLAN OF CARE
Problem: Occupational Therapy - Adult  Goal: By Discharge: Performs self-care activities at highest level of function for planned discharge setting.  See evaluation for individualized goals.  Description: FUNCTIONAL STATUS PRIOR TO ADMISSION: Patient independent with ADLs and functional mobility without device.    HOME SUPPORT: Patient lived alone.    Occupational Therapy Goals:  Initiated 10/7/2024  1.  Patient will perform grooming in standing for 5 minutes without LOB or fatigue with Supervision within 7 day(s).  2.  Patient will perform bathing with Supervision within 7 day(s).  3.  Patient will perform lower body dressing with Supervision within 7 day(s).  4.  Patient will perform toilet transfers with Supervision  within 7 day(s).  5.  Patient will perform all aspects of toileting with Supervision within 7 day(s).  6.  Patient will participate in upper extremity therapeutic exercise/activities with Supervision for 7 minutes within 7 day(s).    7.  Patient will utilize energy conservation techniques during functional activities with verbal cues within 7 day(s).    Outcome: Progressing     OCCUPATIONAL THERAPY TREATMENT  Patient: Shila Hurst (78 y.o. female)  Date: 10/10/2024  Primary Diagnosis: Acute hyponatremia [E87.1]  Hyponatremia [E87.1]  Altered mental status, unspecified altered mental status type [R41.82]       Precautions: Fall Risk                Chart, occupational therapy assessment, plan of care, and goals were reviewed.    ASSESSMENT  Patient continues to benefit from skilled OT services and is progressing towards goals as she engaged in serial ADLs in bathroom with SBA-CGA and minimal rest breaks between tasks. She demonstrates ongoing cognitive deficits, especially with memory, although with continued improvements in orientation, delayed recall, and insight into deficits. She remains motivated for independence and engages well in skilled therapeutic intervention.          PLAN :  Patient

## 2024-10-10 NOTE — PLAN OF CARE
Problem: Physical Therapy - Adult  Goal: By Discharge: Performs mobility at highest level of function for planned discharge setting.  See evaluation for individualized goals.  Description: FUNCTIONAL STATUS PRIOR TO ADMISSION: Patient was independent and active without use of DME. Denies hx falls. Retired .     HOME SUPPORT PRIOR TO ADMISSION: The patient lived alone with no local support.    Physical Therapy Goals  Initiated 10/7/2024  1.  Patient will move from supine to sit and sit to supine in bed with supervision/set-up within 7 day(s).    2.  Patient will perform sit to stand with supervision/set-up within 7 day(s).  3.  Patient will transfer from bed to chair and chair to bed with supervision/set-up using the least restrictive device within 7 day(s).  4.  Patient will ambulate with supervision/set-up for 150 feet with the least restrictive device within 7 day(s).   5.  Patient will ascend/descend 4 stairs with bilateral handrail(s) with supervision/set-up within 7 day(s).    Outcome: Progressing     PHYSICAL THERAPY TREATMENT    Patient: Shila Hurst (78 y.o. female)  Date: 10/10/2024  Diagnosis: Acute hyponatremia [E87.1]  Hyponatremia [E87.1]  Altered mental status, unspecified altered mental status type [R41.82] Acute hyponatremia      Precautions: Fall Risk                      ASSESSMENT:  Patient continues to benefit from skilled PT services and is progressing towards goals. Received in the bathroom w/ RN in the room.  Pt demonstrates overall improvement in cognition, now aware of safety precautions/ to call for RN assist before standing, good recall of inspirometer use, and conversational re: her morning exercise walks w/ neighbors.   She completed transfers and ambulated generally w/ CGA.  Intermittent Min A was needed for steadying assist when working to normalize her gait.   Pt fatigues with distance walked, requires brief rest breaks, able to resume activity after.            PLAN:  Patient continues to benefit from skilled intervention to address the above impairments.  Continue treatment per established plan of care.    Recommend for next PT session: further progression of gait working to normalize strides, stair training    Recommendation for discharge: (in order for the patient to meet his/her long term goals):   High intensity/comprehensive skilled physical therapy in a multidisciplinary setting as patient is working towards tolerating up to 3 hours of therapy/day 5-7x/week    Other factors to consider for discharge: lives alone, impaired cognition, and at risk for falls    IF patient discharges home will need the following DME: continuing to assess with progress       SUBJECTIVE:   Patient stated, \"You're Shila too.\"    OBJECTIVE DATA SUMMARY:   Critical Behavior:  Orientation  Overall Orientation Status: Impaired  Orientation Level: Oriented to person (Oriented to being in the hosp, disoriented to hosp name (states H), Oriented to year, disoriented to month)  Cognition  Overall Cognitive Status: Exceptions  Arousal/Alertness: Appears intact  Following Commands: Follows multistep commands with repitition  Attention Span: Difficulty dividing attention;Attends with cues to redirect  Memory: Impaired;Decreased recall of recent events;Decreased short term memory  Safety Judgement: Impaired;Decreased awareness of need for assistance;Decreased awareness of need for safety  Problem Solving: Impaired  Insights: Decreased awareness of deficits  Initiation: Requires cues for some  Sequencing: Requires cues for some    Functional Mobility Training:  Bed Mobility:  Bed Mobility Training  Bed Mobility Training: No (received in the bathroom w/ RN present; remained up in the chair post activity)  Overall Level of Assistance: Stand-by assistance  Interventions: Verbal cues  Supine to Sit: Stand-by assistance  Scooting: Stand-by assistance  Transfers:  Transfer Training  Transfer Training:

## 2024-10-10 NOTE — PROGRESS NOTES
[facility]                                                                                          Hospitalist Progress Note  Cassia Cazares MD  Office Phone: [phone]        Date of Service:  10/10/2024  NAME:  Shila Hurst  :  1945  MRN:  [id number]       Admission Summary:   Patient sitting in a chair by the bedside, sitter in the room.  Patient much more alert and clearer today, she says she still does not feel herself.  Sodium stagnated below 120 despite 3% saline yesterday and early this morning.            Interval history / Subjective:   Mental status better  Reported  last night           Assessment & Plan:     Severe hyponatremia, acute, symptomatic.  The hyponatremia is caused most probably by limited oral intake, use of thiazide diuretics (she is on losartan HCTZ)  Sodium 114 on admission, stagnated around 119  Acute metabolic encephalopathy due to the above, dehydration  -Head CT negative.  Discontinue HCTZ  -Patient had received some saline in the ED  - Status post 3% saline.  Sodium stagnated around 119.  Further management per neurologist, started on IV Lasix.  -10/8, , patient received tolvaptan noted overcorrected and subsequently requiring DDAVP, D5.  -Sodium improving 132,  ok for d/c with FR 1200 cc/ day, No HCTZ and f/u with renal  in 2 weeks     Hypokalemia, replaced and corrected.    Nausea and vomiting associated with mild elevated LFTs.  -CT A/P unremarkable except cholelithiasis without evidence of cholecystitis, CBD dilatation.  LFT elevation likely due to dehydration versus recent viral infection.  Flu and COVID test negative    Hypoxia.    Acute on chronic diastolic congestive heart failure   Leukocytosis without other manifestation of sepsis.    Probable right upper lobe pneumonia.  Patient with preceding cough and other respiratory symptoms.  Flu and COVID-negative.  Start ceftriaxone and doxycycline.  CT also

## 2024-10-11 VITALS
WEIGHT: 146.7 LBS | OXYGEN SATURATION: 98 % | SYSTOLIC BLOOD PRESSURE: 126 MMHG | HEIGHT: 64 IN | DIASTOLIC BLOOD PRESSURE: 60 MMHG | TEMPERATURE: 97.8 F | RESPIRATION RATE: 18 BRPM | BODY MASS INDEX: 25.04 KG/M2 | HEART RATE: 77 BPM

## 2024-10-11 LAB
ALBUMIN SERPL-MCNC: 3 G/DL (ref 3.5–5)
ALBUMIN/GLOB SERPL: 0.8 (ref 1.1–2.2)
ALP SERPL-CCNC: 97 U/L (ref 45–117)
ALT SERPL-CCNC: 97 U/L (ref 12–78)
ANION GAP SERPL CALC-SCNC: 5 MMOL/L (ref 2–12)
AST SERPL-CCNC: 55 U/L (ref 15–37)
BILIRUB SERPL-MCNC: 0.4 MG/DL (ref 0.2–1)
BUN SERPL-MCNC: 14 MG/DL (ref 6–20)
BUN/CREAT SERPL: 22 (ref 12–20)
CALCIUM SERPL-MCNC: 9.1 MG/DL (ref 8.5–10.1)
CHLORIDE SERPL-SCNC: 102 MMOL/L (ref 97–108)
CO2 SERPL-SCNC: 26 MMOL/L (ref 21–32)
CREAT SERPL-MCNC: 0.65 MG/DL (ref 0.55–1.02)
ERYTHROCYTE [DISTWIDTH] IN BLOOD BY AUTOMATED COUNT: 12.2 % (ref 11.5–14.5)
GLOBULIN SER CALC-MCNC: 4 G/DL (ref 2–4)
GLUCOSE SERPL-MCNC: 110 MG/DL (ref 65–100)
HCT VFR BLD AUTO: 37.5 % (ref 35–47)
HGB BLD-MCNC: 12.5 G/DL (ref 11.5–16)
L PNEUMO1 AG UR QL IA: NEGATIVE
MCH RBC QN AUTO: 29.3 PG (ref 26–34)
MCHC RBC AUTO-ENTMCNC: 33.3 G/DL (ref 30–36.5)
MCV RBC AUTO: 88 FL (ref 80–99)
NRBC # BLD: 0 K/UL (ref 0–0.01)
NRBC BLD-RTO: 0 PER 100 WBC
PLATELET # BLD AUTO: 337 K/UL (ref 150–400)
PMV BLD AUTO: 9 FL (ref 8.9–12.9)
POTASSIUM SERPL-SCNC: 4 MMOL/L (ref 3.5–5.1)
PROT SERPL-MCNC: 7 G/DL (ref 6.4–8.2)
RBC # BLD AUTO: 4.26 M/UL (ref 3.8–5.2)
SODIUM SERPL-SCNC: 133 MMOL/L (ref 136–145)
SPECIMEN SOURCE: NORMAL
WBC # BLD AUTO: 13.5 K/UL (ref 3.6–11)

## 2024-10-11 PROCEDURE — 85027 COMPLETE CBC AUTOMATED: CPT

## 2024-10-11 PROCEDURE — 2580000003 HC RX 258: Performed by: FAMILY MEDICINE

## 2024-10-11 PROCEDURE — 6370000000 HC RX 637 (ALT 250 FOR IP): Performed by: INTERNAL MEDICINE

## 2024-10-11 PROCEDURE — 6370000000 HC RX 637 (ALT 250 FOR IP): Performed by: HOSPITALIST

## 2024-10-11 PROCEDURE — 36415 COLL VENOUS BLD VENIPUNCTURE: CPT

## 2024-10-11 PROCEDURE — 80053 COMPREHEN METABOLIC PANEL: CPT

## 2024-10-11 PROCEDURE — 6370000000 HC RX 637 (ALT 250 FOR IP): Performed by: NURSE PRACTITIONER

## 2024-10-11 PROCEDURE — 6370000000 HC RX 637 (ALT 250 FOR IP): Performed by: FAMILY MEDICINE

## 2024-10-11 PROCEDURE — 97530 THERAPEUTIC ACTIVITIES: CPT | Performed by: OCCUPATIONAL THERAPIST

## 2024-10-11 PROCEDURE — 97535 SELF CARE MNGMENT TRAINING: CPT | Performed by: OCCUPATIONAL THERAPIST

## 2024-10-11 RX ORDER — BENZONATATE 100 MG/1
100 CAPSULE ORAL 3 TIMES DAILY PRN
Qty: 30 CAPSULE | Refills: 1 | Status: SHIPPED
Start: 2024-10-11 | End: 2024-10-31

## 2024-10-11 RX ORDER — POLYETHYLENE GLYCOL 3350 17 G/17G
17 POWDER, FOR SOLUTION ORAL DAILY PRN
Qty: 527 G | Refills: 1 | Status: SHIPPED | OUTPATIENT
Start: 2024-10-11 | End: 2024-12-12

## 2024-10-11 RX ORDER — AMOXICILLIN AND CLAVULANATE POTASSIUM 500; 125 MG/1; MG/1
1 TABLET, FILM COATED ORAL EVERY 8 HOURS SCHEDULED
Qty: 12 TABLET | Refills: 0 | Status: SHIPPED
Start: 2024-10-11 | End: 2024-10-15

## 2024-10-11 RX ORDER — VALSARTAN 160 MG/1
160 TABLET ORAL DAILY
Qty: 30 TABLET | Refills: 3 | Status: SHIPPED
Start: 2024-10-12

## 2024-10-11 RX ADMIN — SODIUM CHLORIDE, PRESERVATIVE FREE 10 ML: 5 INJECTION INTRAVENOUS at 08:57

## 2024-10-11 RX ADMIN — NADOLOL 40 MG: 40 TABLET ORAL at 08:53

## 2024-10-11 RX ADMIN — ERYTHROMYCIN: 5 OINTMENT OPHTHALMIC at 07:23

## 2024-10-11 RX ADMIN — BENZONATATE 100 MG: 100 CAPSULE ORAL at 00:44

## 2024-10-11 RX ADMIN — AMOXICILLIN AND CLAVULANATE POTASSIUM 1 TABLET: 500; 125 TABLET, FILM COATED ORAL at 00:35

## 2024-10-11 RX ADMIN — Medication 3 MG: at 00:35

## 2024-10-11 RX ADMIN — LEVOTHYROXINE SODIUM 75 MCG: 0.07 TABLET ORAL at 07:22

## 2024-10-11 RX ADMIN — AMOXICILLIN AND CLAVULANATE POTASSIUM 1 TABLET: 500; 125 TABLET, FILM COATED ORAL at 08:54

## 2024-10-11 RX ADMIN — VALSARTAN 160 MG: 40 TABLET, FILM COATED ORAL at 08:54

## 2024-10-11 RX ADMIN — ASPIRIN 81 MG CHEWABLE TABLET 81 MG: 81 TABLET CHEWABLE at 08:54

## 2024-10-11 RX ADMIN — ERYTHROMYCIN: 5 OINTMENT OPHTHALMIC at 15:05

## 2024-10-11 ASSESSMENT — PAIN SCALES - GENERAL: PAINLEVEL_OUTOF10: 0

## 2024-10-11 NOTE — PROGRESS NOTES
Transport to: [facility]   Reason for transport: D/C  Transport set up with:  Time/Date: 10/11/24, 1515  D/C Summary loaded: Yes   Nurse/CM notified: Yes  Envelope delivered: Yes  Insurance verified on face sheet: yes      RN reviewed D/C teaching with pt and provided a copy. Pt was given a chance to ask questions. Pt said that she was nervous about transition but was reassured by RN.

## 2024-10-11 NOTE — PROGRESS NOTES
[address], [facility] [place] [address]  Phone: [phone]   [phone]    [email or web address]     Nephrology Progress Note    Patient Name : Shila Hurst      : 1945     MRN : [id number]  Date of Admission : 10/5/2024  Date of Servive : 10/11/24    CC:  Follow up for Hyponatremia        Assessment and Plan   Hyponatremia   - suspect acute on chronic   - chronic from HCTZ use   - acutely SIADH from resp illness   - f/u urine legionella screen   - ok for d/c with FR 1200 cc/ day, No HCTZ  - f/u with me in 2 weeks     Hypokalemia   -better    CAD hx of CABG x3 25 years ago   - echo preserved EF, mild TR, Pulm HTN     RUL PNA  Recent COVID./Flu vaccine     Hypothyroidism     HTN : increased Diovan dose      Interval History:  Seen and examined. BP elevated. Na stable. Improved resp sx.    Review of Systems: Pertinent items are noted in HPI.    Current Medications:   Current Facility-Administered Medications   Medication Dose Route Frequency    valsartan (DIOVAN) tablet 160 mg  160 mg Oral Daily    acetaminophen (TYLENOL) tablet 650 mg  650 mg Oral Q6H PRN    amoxicillin-clavulanate (AUGMENTIN) 500-125 MG per tablet 1 tablet  1 tablet Oral 3 times per day    melatonin tablet 3 mg  3 mg Oral Nightly PRN    sodium chloride flush 0.9 % injection 5-40 mL  5-40 mL IntraVENous 2 times per day    sodium chloride flush 0.9 % injection 5-40 mL  5-40 mL IntraVENous PRN    0.9 % sodium chloride infusion   IntraVENous PRN    ondansetron (ZOFRAN-ODT) disintegrating tablet 4 mg  4 mg Oral Q8H PRN    Or    ondansetron (ZOFRAN) injection 4 mg  4 mg IntraVENous Q6H PRN    polyethylene glycol (GLYCOLAX) packet 17 g  17 g Oral Daily PRN    levothyroxine (SYNTHROID) tablet 75 mcg  75 mcg Oral Daily    benzonatate (TESSALON) capsule 100 mg  100 mg Oral TID PRN    aspirin chewable tablet 81 mg  81 mg Oral Daily    erythromycin (ROMYCIN) ophthalmic ointment   Both Eyes

## 2024-10-11 NOTE — DISCHARGE INSTRUCTIONS
Discharge Instructions       PATIENT ID: Shila Hurst  MRN: [id number]   YOB: 1945    DATE OF ADMISSION: 10/5/2024  5:43 PM    DATE OF DISCHARGE: 10/11/2024   PRIMARY CARE PROVIDER: Cayla Gilmore MD      ATTENDING PHYSICIAN: CASSIA CAZARES MD   DISCHARGING PROVIDER: Cassia Cazares MD    To contact this individual call [phone] and ask the  to page.  If unavailable ask to be transferred the Adult Hospitalist Department.     CONSULTATIONS: IP CONSULT TO HOSPITALIST  IP CONSULT TO NEPHROLOGY     PROCEDURES/SURGERIES: * No surgery found *     ADMITTING DIAGNOSES & HOSPITAL COURSE:   Shila Hurst is a 78 y.o. female with past medical history of CAD, CABG hypertension, hyperlipidemia, hypothyroidism presented as a direct admission/transfer from [facility] ED to [facility] with reports of AMS, nonproductive cough, sore throat.  Patient reportedly had onset of symptoms starting about 5 days ago with nonproductive cough and sore throat.  There is no reports of shortness of breath or chest pain.  She reportedly had known sick contact RSV.  Her neighbors brought her to [facility] due to AMS.  She was reportedly oriented to place but not month or day).  She also had an episode of nausea and vomiting x 1.  On arrival in the ED, initial vital signs temperature 98.2 °F, /72, heart rate 80, respiratory 20, O2 saturation 93% on room air.  O2 saturation decreased to 89%.  12-lead EKG showed normal sinus rhythm, LBBB at 74 bpm.  Chest x-ray 2 view showed no acute cardiopulmonary process.  CT head without IV contrast showed no acute intracranial abnormalities.  Urinalysis showed 100 glucose, 40 ketones, trace protein, 4.0 urobilinogen but otherwise negative UA.  On 10/5/2024, initial sodium 114, K3.4, chloride 79, blood glucose 167, albumin 3.2, , AST 81, total bili 1.2, WBC 13.6, neutrophils 76%.  ED requested admission to the hospital service.  Upon hearing request, requested

## 2024-10-11 NOTE — CARE COORDINATION
Inpatient Rehab/ Hospital to Hospital Transition of Care Note /Discharge Note     Accepting Physician: Dr. Jakub Singer    Discharging Physician: Dr. Cassia Cazares    Accepting Representative:  Tri    Accepting Facility: Hospital[facility]       RN call to report: [phone]    Transport: [facility] phone [phone] or Family      time: 1500    Ambulance packet at bedside chart.       Family notified: CM met with the family member son and/or called and spoke with Gume and they are in agreement with the discharge plan.       The attending physician and the primary nurse were notified of the plan.     Mony Kirkpatrick RN CM    [facility]

## 2024-10-11 NOTE — PLAN OF CARE
Fair;Unsupported;Good      ADL Intervention:     Educated on fall prevention related to:  -footwear and benefit of shoes around heel, avoid slides/flip-flops, loose crocs  -use of protective undergarment at night and intentional toileting during the day to prevent rushing to the bathroom  - Educated on slow to rise transfers with transition sup to standing  - safety awareness if feeling dizzy/lightheaded, benefit of ankle pumps, modification of task    Educated on use of support during item retrieval from floor for fall prevention    Patient instructed and indicated understanding the benefits of maintaining activity tolerance, functional mobility, and independence with self care tasks during acute stay  to ensure safe return home and to baseline. Encouraged patient to increase frequency and duration OOB, be out of bed for all meals, perform daily ADLs (as approved by RN/MD regarding bathing etc), and performing functional mobility to/from bathroom.           Toileting: Contact guard assistance              Functional Mobility: Contact guard assistance;Minimal assistance  Functional Mobility Skilled Clinical Factors: increased assist when carrying items and instructed to increase step length and stand upright versus short shuffling steps with flexed posture at fast pace. demonstrated both techniques and performed in room to increase understanding of cues.   Pain Ratin/10   Pain Intervention(s):   pain is at a level acceptable to the patient      Activity Tolerance:   Good  Please refer to the flowsheet for vital signs taken during this treatment.    After treatment:   Patient left in no apparent distress sitting up in chair, Call bell within reach, and Bed/ chair alarm activated    COMMUNICATION/EDUCATION:   The patient's plan of care was discussed with: registered nurse    Patient Education  Education Given To: Patient  Education Provided: Role of Therapy;Plan of Care;Transfer Training;Fall Prevention  Strategies;Mobility Training;Precautions  Education Method: Demonstration;Verbal  Barriers to Learning: Cognition  Education Outcome: Verbalized understanding;Continued education needed;Demonstrated understanding    Thank you for this referral.  Minal Smith OTR/L  Minutes: 24

## 2024-10-11 NOTE — DISCHARGE SUMMARY
Discharge Summary       PATIENT ID: Shila Hurst  MRN: [id number]   YOB: 1945    DATE OF ADMISSION: 10/5/2024  5:43 PM    DATE OF DISCHARGE: 10/11/2024   PRIMARY CARE PROVIDER: Cayla Gilmore MD     ATTENDING PHYSICIAN: CASSIA CAZARES MD   DISCHARGING PROVIDER: Cassia Cazares MD    To contact this individual call [phone] and ask the  to page.  If unavailable ask to be transferred the Adult Hospitalist Department.    CONSULTATIONS: IP CONSULT TO HOSPITALIST  IP CONSULT TO NEPHROLOGY    PROCEDURES/SURGERIES: * No surgery found *    ADMITTING DIAGNOSES & HOSPITAL COURSE:   Shila Hurst is a 78 y.o. female with past medical history of CAD, CABG hypertension, hyperlipidemia, hypothyroidism presented as a direct admission/transfer from [facility] ED to [facility] with reports of AMS, nonproductive cough, sore throat.  Patient reportedly had onset of symptoms starting about 5 days ago with nonproductive cough and sore throat.  There is no reports of shortness of breath or chest pain.  She reportedly had known sick contact RSV.  Her neighbors brought her to [facility] due to AMS.  She was reportedly oriented to place but not month or day).  She also had an episode of nausea and vomiting x 1.  On arrival in the ED, initial vital signs temperature 98.2 °F, /72, heart rate 80, respiratory 20, O2 saturation 93% on room air.  O2 saturation decreased to 89%.  12-lead EKG showed normal sinus rhythm, LBBB at 74 bpm.  Chest x-ray 2 view showed no acute cardiopulmonary process.  CT head without IV contrast showed no acute intracranial abnormalities.  Urinalysis showed 100 glucose, 40 ketones, trace protein, 4.0 urobilinogen but otherwise negative UA.  On 10/5/2024, initial sodium 114, K3.4, chloride 79, blood glucose 167, albumin 3.2, , AST 81, total bili 1.2, WBC 13.6, neutrophils 76%.  ED requested admission to the hospital service.  Upon hearing request, requested nephrology  these medications      valsartan-hydroCHLOROthiazide 80-12.5 MG per tablet  Commonly known as: DIOVAN-HCT               Where to Get Your Medications        These medications were sent to [facility]/pharmacy #[id number] - [place], [place] - [address] - P [phone] - F [phone]  [address] [address]      Phone: [phone]   polyethylene glycol 17 g packet       Information about where to get these medications is not yet available    Ask your nurse or doctor about these medications  amoxicillin-clavulanate 500-125 MG per tablet  benzonatate 100 MG capsule  valsartan 160 MG tablet           NOTIFY YOUR PHYSICIAN FOR ANY OF THE FOLLOWING:   Fever over 101 degrees for 24 hours.   Chest pain, shortness of breath, fever, chills, nausea, vomiting, diarrhea, change in mentation, falling, weakness, bleeding. Severe pain or pain not relieved by medications.  Or, any other signs or symptoms that you may have questions about.    DISPOSITION:    Home With:   OT  PT  HH  RN      x Long term SNF/Inpatient Rehab    Independent/assisted living    Hospice    Other:       PATIENT CONDITION AT DISCHARGE:     Functional status    Poor    x Deconditioned     Independent      Cognition     Lucid    x Forgetful     Dementia      Catheters/lines (plus indication)    Obrien     PICC     PEG    x None      Code status   x Full code     DNR      PHYSICAL EXAMINATION AT DISCHARGE:    General : alert x 3, awake, no acute distress,   HEENT: PEERL, EOMI, moist mucus membrane, TM clear  Neck: supple, no JVD, no meningeal signs  Chest: Clear to auscultation bilaterally   CVS: S1 S2 heard, Capillary refill less than 2 seconds  Abd: soft/ Non tender, non distended, BS physiological,   Ext: no clubbing, no cyanosis, no edema, brisk 2+ DP pulses  Neuro/Psych: pleasant mood and affect, CN 2-12 grossly intact, non focal   CHRONIC MEDICAL DIAGNOSES:      Greater than 31 minutes were spent with the patient on counseling and

## 2024-10-11 NOTE — PLAN OF CARE
Problem: Discharge Planning  Goal: Discharge to home or other facility with appropriate resources  10/10/2024 2344 by Landen Roque RN  Outcome: Progressing     Problem: Safety - Adult  Goal: Free from fall injury  10/10/2024 2344 by Landen Roque RN  Outcome: Progressing     Problem: Chronic Conditions and Co-morbidities  Goal: Patient's chronic conditions and co-morbidity symptoms are monitored and maintained or improved  10/10/2024 2344 by Landen Roque RN  Outcome: Progressing     Problem: Pain  Goal: Verbalizes/displays adequate comfort level or baseline comfort level  10/10/2024 2344 by Landen Roque RN  Outcome: Progressing     Problem: Skin/Tissue Integrity  Goal: Absence of new skin breakdown  Description: 1.  Monitor for areas of redness and/or skin breakdown  2.  Assess vascular access sites hourly  3.  Every 4-6 hours minimum:  Change oxygen saturation probe site  4.  Every 4-6 hours:  If on nasal continuous positive airway pressure, respiratory therapy assess nares and determine need for appliance change or resting period.  10/10/2024 2344 by Landen Roque RN  Outcome: Progressing